# Patient Record
Sex: FEMALE | Race: BLACK OR AFRICAN AMERICAN | Employment: OTHER | URBAN - METROPOLITAN AREA
[De-identification: names, ages, dates, MRNs, and addresses within clinical notes are randomized per-mention and may not be internally consistent; named-entity substitution may affect disease eponyms.]

---

## 2017-02-23 ENCOUNTER — HOSPITAL ENCOUNTER (OUTPATIENT)
Dept: VASCULAR SURGERY | Age: 82
Discharge: HOME | End: 2017-02-23
Attending: PODIATRIST
Payer: MEDICARE

## 2017-02-23 DIAGNOSIS — L97.422 CHRONIC HEEL ULCER, LEFT, WITH FAT LAYER EXPOSED (HCC): ICD-10-CM

## 2017-02-23 DIAGNOSIS — I70.219 ATHEROSCLER NATIVE ARTERIES THE EXTREMITIES W/INTERMIT CLAUDICATION (HCC): ICD-10-CM

## 2017-02-23 PROCEDURE — 93923 UPR/LXTR ART STDY 3+ LVLS: CPT

## 2017-02-23 NOTE — PROCEDURES
[facility]  *** FINAL REPORT ***    Name: Petar Rios  MRN: [id number]    Outpatient  : 1935  HIS Order #: [id number]  [id number] [facility] Visit #: [id number]  Date: 2017    TYPE OF TEST: Peripheral Arterial Testing    REASON FOR TEST  Extremity ulceration (left side)    Right Leg  Doppler:    Normal  Ankle/Brachial: 1.01    Left Leg  Doppler:    Abnormal  Ankle/Brachial: 0.65    Site of occlusive disease:-  tibioperoneal segment    INTERPRETATION/FINDINGS  Physiologic testing was performed using continuous wave Doppler and  segmental pressures. 1. No evidence of significant arterial pcclusive disease in the right  leg at rest by the waveform analysis. Dorsalis artery is non  compressible and ankle brachial index on posterior tibial 1.01. Right toe/ brachial index is 0.40  2. Mild-moderate peripheral arterial disease indicated at rest in the  left leg. Disease is located in the tibioperoneal segment on the left  side by waveform. The left ankle/brachial index is 1.21 and the left  toe/brachial index is 0.29    ADDITIONAL COMMENTS    I have personally reviewed the data relevant to the interpretation of  this  study. TECHNOLOGIST: Sobia Maurice  Signed: 2017 12:20 PM    PHYSICIAN: Danny Arce MD  Signed: 2017 02:25 PM

## 2017-07-24 ENCOUNTER — APPOINTMENT (OUTPATIENT)
Dept: WOUND CARE | Age: 82
End: 2017-07-24

## 2018-11-05 ENCOUNTER — HOSPITAL ENCOUNTER (OUTPATIENT)
Dept: MRI IMAGING | Age: 83
Discharge: HOME | End: 2018-11-05
Attending: ORTHOPAEDIC SURGERY
Payer: MEDICARE

## 2018-11-05 DIAGNOSIS — M25.562 ARTHRALGIA OF KNEE, LEFT: ICD-10-CM

## 2018-11-05 DIAGNOSIS — Z96.659 PRESENCE OF UNSPECIFIED ARTIFICIAL KNEE JOINT: ICD-10-CM

## 2018-11-05 DIAGNOSIS — Z47.1 AFTERCARE FOLLOWING JOINT REPLACEMENT SURGERY: ICD-10-CM

## 2018-11-05 PROCEDURE — 73721 MRI JNT OF LWR EXTRE W/O DYE: CPT

## 2018-12-14 ENCOUNTER — HOSPITAL ENCOUNTER (OUTPATIENT)
Dept: GENERAL RADIOLOGY | Age: 83
Discharge: HOME | End: 2018-12-14
Attending: ORTHOPAEDIC SURGERY
Payer: MEDICARE

## 2018-12-14 ENCOUNTER — HOSPITAL ENCOUNTER (OUTPATIENT)
Dept: NUCLEAR MEDICINE | Age: 83
Discharge: HOME | End: 2018-12-14
Attending: ORTHOPAEDIC SURGERY
Payer: MEDICARE

## 2018-12-14 DIAGNOSIS — Z96.652 PRESENCE OF LEFT ARTIFICIAL KNEE JOINT: ICD-10-CM

## 2018-12-14 DIAGNOSIS — Z47.1 AFTERCARE FOLLOWING JOINT REPLACEMENT SURGERY: ICD-10-CM

## 2018-12-14 DIAGNOSIS — R52 PAIN: ICD-10-CM

## 2018-12-14 DIAGNOSIS — M25.562 LEFT KNEE PAIN: ICD-10-CM

## 2018-12-14 DIAGNOSIS — M19.90 OSTEOARTHRITIS: ICD-10-CM

## 2018-12-14 PROCEDURE — 78315 BONE IMAGING 3 PHASE: CPT

## 2018-12-14 PROCEDURE — 73564 X-RAY EXAM KNEE 4 OR MORE: CPT

## 2019-02-14 ENCOUNTER — HOSPITAL ENCOUNTER (OUTPATIENT)
Dept: PREADMISSION TESTING | Age: 84
Discharge: HOME | End: 2019-02-14
Payer: MEDICARE

## 2019-02-14 DIAGNOSIS — Z01.818 PREOP EXAMINATION: ICD-10-CM

## 2019-02-14 LAB
ALBUMIN SERPL-MCNC: 3.3 G/DL (ref 3.4–5)
ALBUMIN/GLOB SERPL: 1 {RATIO} (ref 0.8–1.7)
ALP SERPL-CCNC: 79 U/L (ref 45–117)
ALT SERPL-CCNC: 19 U/L (ref 13–56)
ANION GAP SERPL CALC-SCNC: 8 MMOL/L (ref 3–18)
APPEARANCE UR: CLEAR
APTT PPP: 40.3 SEC (ref 23–36.4)
AST SERPL-CCNC: 21 U/L (ref 15–37)
BACTERIA SPEC CULT: NORMAL
BACTERIA URNS QL MICRO: ABNORMAL /HPF
BASOPHILS # BLD: 0 K/UL (ref 0–0.1)
BASOPHILS NFR BLD: 0 % (ref 0–2)
BILIRUB SERPL-MCNC: 0.4 MG/DL (ref 0.2–1)
BILIRUB UR QL: NEGATIVE
BUN SERPL-MCNC: 17 MG/DL (ref 7–18)
BUN/CREAT SERPL: 18 (ref 12–20)
CALCIUM SERPL-MCNC: 8.8 MG/DL (ref 8.5–10.1)
CHLORIDE SERPL-SCNC: 104 MMOL/L (ref 100–108)
CO2 SERPL-SCNC: 32 MMOL/L (ref 21–32)
COLOR UR: YELLOW
CREAT SERPL-MCNC: 0.93 MG/DL (ref 0.6–1.3)
DIFFERENTIAL METHOD BLD: ABNORMAL
EOSINOPHIL # BLD: 0.1 K/UL (ref 0–0.4)
EOSINOPHIL NFR BLD: 2 % (ref 0–5)
EPITH CASTS URNS QL MICRO: ABNORMAL /LPF (ref 0–5)
ERYTHROCYTE [DISTWIDTH] IN BLOOD BY AUTOMATED COUNT: 14.7 % (ref 11.6–14.5)
ERYTHROCYTE [SEDIMENTATION RATE] IN BLOOD: 16 MM/HR (ref 0–30)
EST. AVERAGE GLUCOSE BLD GHB EST-MCNC: 123 MG/DL
GLOBULIN SER CALC-MCNC: 3.2 G/DL (ref 2–4)
GLUCOSE SERPL-MCNC: 91 MG/DL (ref 74–99)
GLUCOSE UR STRIP.AUTO-MCNC: NEGATIVE MG/DL
HBA1C MFR BLD: 5.9 % (ref 4.2–5.6)
HCT VFR BLD AUTO: 35.7 % (ref 35–45)
HGB BLD-MCNC: 11.2 G/DL (ref 12–16)
HGB UR QL STRIP: ABNORMAL
INR PPP: 1 (ref 0.8–1.2)
KETONES UR QL STRIP.AUTO: NEGATIVE MG/DL
LEUKOCYTE ESTERASE UR QL STRIP.AUTO: ABNORMAL
LYMPHOCYTES # BLD: 1.5 K/UL (ref 0.9–3.6)
LYMPHOCYTES NFR BLD: 33 % (ref 21–52)
MCH RBC QN AUTO: 28 PG (ref 24–34)
MCHC RBC AUTO-ENTMCNC: 31.4 G/DL (ref 31–37)
MCV RBC AUTO: 89.3 FL (ref 74–97)
MONOCYTES # BLD: 0.2 K/UL (ref 0.05–1.2)
MONOCYTES NFR BLD: 5 % (ref 3–10)
NEUTS SEG # BLD: 2.7 K/UL (ref 1.8–8)
NEUTS SEG NFR BLD: 60 % (ref 40–73)
NITRITE UR QL STRIP.AUTO: NEGATIVE
PH UR STRIP: 5.5 [PH] (ref 5–8)
PLATELET # BLD AUTO: 196 K/UL (ref 135–420)
PMV BLD AUTO: 9.6 FL (ref 9.2–11.8)
POTASSIUM SERPL-SCNC: 3.8 MMOL/L (ref 3.5–5.5)
PROT SERPL-MCNC: 6.5 G/DL (ref 6.4–8.2)
PROT UR STRIP-MCNC: NEGATIVE MG/DL
PROTHROMBIN TIME: 12.5 SEC (ref 11.5–15.2)
RBC # BLD AUTO: 4 M/UL (ref 4.2–5.3)
RBC #/AREA URNS HPF: ABNORMAL /HPF (ref 0–5)
SERVICE CMNT-IMP: NORMAL
SODIUM SERPL-SCNC: 144 MMOL/L (ref 136–145)
SP GR UR REFRACTOMETRY: 1.02 (ref 1–1.03)
UROBILINOGEN UR QL STRIP.AUTO: 0.2 EU/DL (ref 0.2–1)
WBC # BLD AUTO: 4.4 K/UL (ref 4.6–13.2)
WBC URNS QL MICRO: ABNORMAL /HPF (ref 0–5)

## 2019-02-14 PROCEDURE — 80053 COMPREHEN METABOLIC PANEL: CPT

## 2019-02-14 PROCEDURE — 85610 PROTHROMBIN TIME: CPT

## 2019-02-14 PROCEDURE — 36415 COLL VENOUS BLD VENIPUNCTURE: CPT

## 2019-02-14 PROCEDURE — 85730 THROMBOPLASTIN TIME PARTIAL: CPT

## 2019-02-14 PROCEDURE — 85652 RBC SED RATE AUTOMATED: CPT

## 2019-02-14 PROCEDURE — 81001 URINALYSIS AUTO W/SCOPE: CPT

## 2019-02-14 PROCEDURE — 83036 HEMOGLOBIN GLYCOSYLATED A1C: CPT

## 2019-02-14 PROCEDURE — 87086 URINE CULTURE/COLONY COUNT: CPT

## 2019-02-14 PROCEDURE — 85025 COMPLETE CBC W/AUTO DIFF WBC: CPT

## 2019-02-14 PROCEDURE — 93005 ELECTROCARDIOGRAM TRACING: CPT

## 2019-02-14 PROCEDURE — 87641 MR-STAPH DNA AMP PROBE: CPT

## 2019-02-15 LAB
ATRIAL RATE: 63 BPM
BACTERIA SPEC CULT: NORMAL
CALCULATED P AXIS, ECG09: 56 DEGREES
CALCULATED R AXIS, ECG10: -69 DEGREES
CALCULATED T AXIS, ECG11: -30 DEGREES
DIAGNOSIS, 93000: NORMAL
P-R INTERVAL, ECG05: 176 MS
Q-T INTERVAL, ECG07: 422 MS
QRS DURATION, ECG06: 104 MS
QTC CALCULATION (BEZET), ECG08: 431 MS
SERVICE CMNT-IMP: NORMAL
VENTRICULAR RATE, ECG03: 63 BPM

## 2019-04-05 ENCOUNTER — HOSPITAL ENCOUNTER (OUTPATIENT)
Dept: PREADMISSION TESTING | Age: 84
Discharge: HOME | End: 2019-04-05
Payer: MEDICARE

## 2019-04-05 LAB
ALBUMIN SERPL-MCNC: 3.5 G/DL (ref 3.4–5)
ALBUMIN/GLOB SERPL: 1 {RATIO} (ref 0.8–1.7)
ALP SERPL-CCNC: 71 U/L (ref 45–117)
ALT SERPL-CCNC: 19 U/L (ref 13–56)
ANION GAP SERPL CALC-SCNC: 4 MMOL/L (ref 3–18)
APPEARANCE UR: CLEAR
APTT PPP: 38.4 SEC (ref 23–36.4)
AST SERPL-CCNC: 20 U/L (ref 15–37)
BACTERIA SPEC CULT: NORMAL
BASOPHILS # BLD: 0 K/UL (ref 0–0.1)
BASOPHILS NFR BLD: 0 % (ref 0–2)
BILIRUB SERPL-MCNC: 0.8 MG/DL (ref 0.2–1)
BILIRUB UR QL: NEGATIVE
BUN SERPL-MCNC: 18 MG/DL (ref 7–18)
BUN/CREAT SERPL: 19 (ref 12–20)
CALCIUM SERPL-MCNC: 8.9 MG/DL (ref 8.5–10.1)
CHLORIDE SERPL-SCNC: 105 MMOL/L (ref 100–108)
CO2 SERPL-SCNC: 32 MMOL/L (ref 21–32)
COLOR UR: YELLOW
CREAT SERPL-MCNC: 0.93 MG/DL (ref 0.6–1.3)
DIFFERENTIAL METHOD BLD: ABNORMAL
EOSINOPHIL # BLD: 0.1 K/UL (ref 0–0.4)
EOSINOPHIL NFR BLD: 1 % (ref 0–5)
ERYTHROCYTE [DISTWIDTH] IN BLOOD BY AUTOMATED COUNT: 15.4 % (ref 11.6–14.5)
ERYTHROCYTE [SEDIMENTATION RATE] IN BLOOD: 17 MM/HR (ref 0–30)
EST. AVERAGE GLUCOSE BLD GHB EST-MCNC: 114 MG/DL
GLOBULIN SER CALC-MCNC: 3.4 G/DL (ref 2–4)
GLUCOSE SERPL-MCNC: 90 MG/DL (ref 74–99)
GLUCOSE UR STRIP.AUTO-MCNC: NEGATIVE MG/DL
HBA1C MFR BLD: 5.6 % (ref 4.2–5.6)
HCT VFR BLD AUTO: 33.8 % (ref 35–45)
HGB BLD-MCNC: 10.8 G/DL (ref 12–16)
HGB UR QL STRIP: NEGATIVE
INR PPP: 1 (ref 0.8–1.2)
KETONES UR QL STRIP.AUTO: NEGATIVE MG/DL
LEUKOCYTE ESTERASE UR QL STRIP.AUTO: NEGATIVE
LYMPHOCYTES # BLD: 1.8 K/UL (ref 0.9–3.6)
LYMPHOCYTES NFR BLD: 27 % (ref 21–52)
MCH RBC QN AUTO: 28.4 PG (ref 24–34)
MCHC RBC AUTO-ENTMCNC: 32 G/DL (ref 31–37)
MCV RBC AUTO: 88.9 FL (ref 74–97)
MONOCYTES # BLD: 0.3 K/UL (ref 0.05–1.2)
MONOCYTES NFR BLD: 5 % (ref 3–10)
NEUTS SEG # BLD: 4.3 K/UL (ref 1.8–8)
NEUTS SEG NFR BLD: 67 % (ref 40–73)
NITRITE UR QL STRIP.AUTO: NEGATIVE
PH UR STRIP: 5.5 [PH] (ref 5–8)
PLATELET # BLD AUTO: 236 K/UL (ref 135–420)
PMV BLD AUTO: 9.9 FL (ref 9.2–11.8)
POTASSIUM SERPL-SCNC: 3.9 MMOL/L (ref 3.5–5.5)
PROT SERPL-MCNC: 6.9 G/DL (ref 6.4–8.2)
PROT UR STRIP-MCNC: NEGATIVE MG/DL
PROTHROMBIN TIME: 12.8 SEC (ref 11.5–15.2)
RBC # BLD AUTO: 3.8 M/UL (ref 4.2–5.3)
SERVICE CMNT-IMP: NORMAL
SODIUM SERPL-SCNC: 141 MMOL/L (ref 136–145)
SP GR UR REFRACTOMETRY: 1.01 (ref 1–1.03)
UROBILINOGEN UR QL STRIP.AUTO: 0.2 EU/DL (ref 0.2–1)
WBC # BLD AUTO: 6.5 K/UL (ref 4.6–13.2)

## 2019-04-05 PROCEDURE — 85652 RBC SED RATE AUTOMATED: CPT

## 2019-04-05 PROCEDURE — 81003 URINALYSIS AUTO W/O SCOPE: CPT

## 2019-04-05 PROCEDURE — 87086 URINE CULTURE/COLONY COUNT: CPT

## 2019-04-05 PROCEDURE — 36415 COLL VENOUS BLD VENIPUNCTURE: CPT

## 2019-04-05 PROCEDURE — 87641 MR-STAPH DNA AMP PROBE: CPT

## 2019-04-05 PROCEDURE — 85025 COMPLETE CBC W/AUTO DIFF WBC: CPT

## 2019-04-05 PROCEDURE — 83036 HEMOGLOBIN GLYCOSYLATED A1C: CPT

## 2019-04-05 PROCEDURE — 85610 PROTHROMBIN TIME: CPT

## 2019-04-05 PROCEDURE — 85730 THROMBOPLASTIN TIME PARTIAL: CPT

## 2019-04-05 PROCEDURE — 80053 COMPREHEN METABOLIC PANEL: CPT

## 2019-04-06 LAB
BACTERIA SPEC CULT: ABNORMAL
BACTERIA SPEC CULT: ABNORMAL
SERVICE CMNT-IMP: ABNORMAL

## 2019-04-25 NOTE — H&P
[address] History and Physical Exam 
 
Patient: Diana Vang MRN: [id number]  SSN: [id number] YOB: 1935  Age: 81 y.o. Sex: female Subjective: Chief Complaint: left knee pain History of Present Illness:  Patient complains of left knee pain and difficulty ambulating. Patient had primary L total knee replacement 2/21/2005 Past Medical History:  
Diagnosis Date  Arthritis  CAD (coronary artery disease) Athrosclerotic, recent cardiac cath 2019  Hypertension many years  Ill-defined condition   
 varicose veins bilateral  
 Thyroid disease many years Past Surgical History:  
Procedure Laterality Date  HX CATARACT REMOVAL    
 HX HEART CATHETERIZATION  04/2019  HX HYSTERECTOMY  HX ORTHOPAEDIC Left   
 hip replacement  HX ORTHOPAEDIC Bilateral   
 knee replacement  HX ORTHOPAEDIC Right   
 thr  
 
Social History Occupational History  Not on file Tobacco Use  Smoking status: Never Smoker  Smokeless tobacco: Never Used Substance and Sexual Activity  Alcohol use: No  
 Drug use: No  
 Sexual activity: Not on file Comment: No  
 
Prior to Admission medications Medication Sig Start Date End Date Taking? Authorizing Provider  
alendronate (FOSAMAX) 70 mg tablet Take 70 mg by mouth Every Saturday. Provider, Historical  
ibuprofen (MOTRIN) 400 mg tablet Take  by mouth every six (6) hours as needed for Pain. Provider, Historical  
acetaminophen (TYLENOL) 325 mg tablet Take 650 mg by mouth every four (4) hours as needed for Pain. Provider, Historical  
lisinopril-hydroCHLOROthiazide (PRINZIDE, ZESTORETIC) 20-12.5 mg per tablet Take 1 Tab by mouth daily. Provider, Historical  
cholecalciferol (VITAMIN D3) 1,000 unit tablet Take 1,000 Units by mouth daily. Provider, Historical  
vit B-comp w-Fe,Ca,FA<1mg (IRON-VITAMINS PO) Take  by mouth.     Provider, Historical  
 ferrous sulfate (IRON) 325 mg (65 mg iron) tablet Take  by mouth Daily (before breakfast). Provider, Historical  
levothyroxine (SYNTHROID) 25 mcg tablet Take 50 mcg by mouth Daily (before breakfast). Provider, Historical  
cyanocobalamin 1,000 mcg tablet Take 1,000 mcg by mouth daily. Provider, Historical  
 
 
Allergies: Allergies Allergen Reactions  Morphine Other (comments) \"Goes crazy\" Family History: Hypertension, osteoarthritis, heart disease Review of Systems: A comprehensive review of systems was negative except for that written in the History of Present Illness. Objective:   
  
Physical Exam: 
HEENT: Normocephalic, atraumatic Lungs:  Clear to auscultation Heart:   Regular rate and rhythm Abdomen: Soft Extremities:  Pain with range of motion of the left knee Neurological: Grossly neurovascularly intact Assessment:   
 
Failed left total knee arthroplasty. Plan: The patient has failed previous efforts of conservative management to include rest and therapy. Due to the fact that conservative efforts failed, the patient became a candidate for surgical intervention. Proceed with scheduled left total knee arthroplasty revision. The various methods of treatment have been discussed with the patient and family. After consideration of risks, benefits, and other options for treatment, the patient has consented to surgical interventions. Questions were answered and preoperative teaching was done by Dr. Rupert Contreras. Patient being admitted as inpatient due to patient age and nature of the surgery. Signed By: Manolo Guy PA-C April 25, 2019

## 2019-04-29 ENCOUNTER — ANESTHESIA (OUTPATIENT)
Dept: SURGERY | Age: 84
DRG: 468 | End: 2019-04-29
Payer: MEDICARE

## 2019-04-29 ENCOUNTER — APPOINTMENT (OUTPATIENT)
Dept: GENERAL RADIOLOGY | Age: 84
DRG: 468 | End: 2019-04-29
Attending: PHYSICIAN ASSISTANT
Payer: MEDICARE

## 2019-04-29 ENCOUNTER — HOSPITAL ENCOUNTER (INPATIENT)
Age: 84
LOS: 2 days | Discharge: NURSING, REHAB OR LONG-TERM CARE | DRG: 468 | End: 2019-05-01
Attending: ORTHOPAEDIC SURGERY | Admitting: ORTHOPAEDIC SURGERY
Payer: MEDICARE

## 2019-04-29 ENCOUNTER — ANESTHESIA EVENT (OUTPATIENT)
Dept: SURGERY | Age: 84
DRG: 468 | End: 2019-04-29
Payer: MEDICARE

## 2019-04-29 DIAGNOSIS — Z96.652 S/P REVISION OF TOTAL KNEE, LEFT: Primary | ICD-10-CM

## 2019-04-29 PROBLEM — T84.018A FAILED TOTAL KNEE ARTHROPLASTY (HCC): Status: ACTIVE | Noted: 2019-04-29

## 2019-04-29 PROBLEM — Z96.659 FAILED TOTAL KNEE ARTHROPLASTY (HCC): Status: ACTIVE | Noted: 2019-04-29

## 2019-04-29 LAB
ABO + RH BLD: NORMAL
BLOOD GROUP ANTIBODIES SERPL: NORMAL
SPECIMEN EXP DATE BLD: NORMAL

## 2019-04-29 PROCEDURE — C1776 JOINT DEVICE (IMPLANTABLE): HCPCS | Performed by: ORTHOPAEDIC SURGERY

## 2019-04-29 PROCEDURE — 77030016060 HC NDL NRV BLK TELE -A: Performed by: SPECIALIST

## 2019-04-29 PROCEDURE — 77030011628: Performed by: ORTHOPAEDIC SURGERY

## 2019-04-29 PROCEDURE — 77030006788 HC BLD SAW OSC STRY -B: Performed by: ORTHOPAEDIC SURGERY

## 2019-04-29 PROCEDURE — 74011000250 HC RX REV CODE- 250: Performed by: ORTHOPAEDIC SURGERY

## 2019-04-29 PROCEDURE — 77030018846 HC SOL IRR STRL H20 ICUM -A: Performed by: ORTHOPAEDIC SURGERY

## 2019-04-29 PROCEDURE — 36415 COLL VENOUS BLD VENIPUNCTURE: CPT

## 2019-04-29 PROCEDURE — 77030003666 HC NDL SPINAL BD -A: Performed by: ORTHOPAEDIC SURGERY

## 2019-04-29 PROCEDURE — 74011250637 HC RX REV CODE- 250/637: Performed by: SPECIALIST

## 2019-04-29 PROCEDURE — 77030020813 HC INST SCULP CEM KT DISP S&N -B: Performed by: ORTHOPAEDIC SURGERY

## 2019-04-29 PROCEDURE — 76210000006 HC OR PH I REC 0.5 TO 1 HR: Performed by: ORTHOPAEDIC SURGERY

## 2019-04-29 PROCEDURE — 76060000041 HC ANESTHESIA 5 TO 5.5 HR: Performed by: ORTHOPAEDIC SURGERY

## 2019-04-29 PROCEDURE — 77030018835 HC SOL IRR LR ICUM -A: Performed by: ORTHOPAEDIC SURGERY

## 2019-04-29 PROCEDURE — C1713 ANCHOR/SCREW BN/BN,TIS/BN: HCPCS | Performed by: ORTHOPAEDIC SURGERY

## 2019-04-29 PROCEDURE — 0SRD0J9 REPLACEMENT OF LEFT KNEE JOINT WITH SYNTHETIC SUBSTITUTE, CEMENTED, OPEN APPROACH: ICD-10-PCS | Performed by: ORTHOPAEDIC SURGERY

## 2019-04-29 PROCEDURE — 77030011264 HC ELECTRD BLD EXT COVD -A: Performed by: ORTHOPAEDIC SURGERY

## 2019-04-29 PROCEDURE — 74011250636 HC RX REV CODE- 250/636: Performed by: PHYSICIAN ASSISTANT

## 2019-04-29 PROCEDURE — 77030000032 HC CUF TRNQT ZIMM -B: Performed by: ORTHOPAEDIC SURGERY

## 2019-04-29 PROCEDURE — 77030012551: Performed by: ORTHOPAEDIC SURGERY

## 2019-04-29 PROCEDURE — 74011250637 HC RX REV CODE- 250/637: Performed by: PHYSICIAN ASSISTANT

## 2019-04-29 PROCEDURE — 77030035643 HC BLD SAW OSC PRECIS STRY -C: Performed by: ORTHOPAEDIC SURGERY

## 2019-04-29 PROCEDURE — 77030036563 HC WRP CLD THER KNE S2SG -B: Performed by: ORTHOPAEDIC SURGERY

## 2019-04-29 PROCEDURE — 76010000137 HC OR TIME 5 TO 5.5 HR: Performed by: ORTHOPAEDIC SURGERY

## 2019-04-29 PROCEDURE — 74011000250 HC RX REV CODE- 250

## 2019-04-29 PROCEDURE — 74011000250 HC RX REV CODE- 250: Performed by: PHYSICIAN ASSISTANT

## 2019-04-29 PROCEDURE — C9290 INJ, BUPIVACAINE LIPOSOME: HCPCS | Performed by: ORTHOPAEDIC SURGERY

## 2019-04-29 PROCEDURE — 74011250636 HC RX REV CODE- 250/636

## 2019-04-29 PROCEDURE — 77030013708 HC HNDPC SUC IRR PULS STRY –B: Performed by: ORTHOPAEDIC SURGERY

## 2019-04-29 PROCEDURE — 77030006835 HC BLD SAW SAG STRY -B: Performed by: ORTHOPAEDIC SURGERY

## 2019-04-29 PROCEDURE — 77030003029 HC SUT VCRL J&J -B: Performed by: ORTHOPAEDIC SURGERY

## 2019-04-29 PROCEDURE — 74011000258 HC RX REV CODE- 258: Performed by: ORTHOPAEDIC SURGERY

## 2019-04-29 PROCEDURE — 65270000029 HC RM PRIVATE

## 2019-04-29 PROCEDURE — 77030040361 HC SLV COMPR DVT MDII -B: Performed by: ORTHOPAEDIC SURGERY

## 2019-04-29 PROCEDURE — 74011250636 HC RX REV CODE- 250/636: Performed by: SPECIALIST

## 2019-04-29 PROCEDURE — 77030012508 HC MSK AIRWY LMA AMBU -A: Performed by: ANESTHESIOLOGY

## 2019-04-29 PROCEDURE — 64450 NJX AA&/STRD OTHER PN/BRANCH: CPT | Performed by: SPECIALIST

## 2019-04-29 PROCEDURE — 74011250636 HC RX REV CODE- 250/636: Performed by: ORTHOPAEDIC SURGERY

## 2019-04-29 PROCEDURE — 77030003028 HC SUT VCRL J&J -A: Performed by: ORTHOPAEDIC SURGERY

## 2019-04-29 PROCEDURE — 77030020782 HC GWN BAIR PAWS FLX 3M -B: Performed by: ORTHOPAEDIC SURGERY

## 2019-04-29 PROCEDURE — 86900 BLOOD TYPING SEROLOGIC ABO: CPT

## 2019-04-29 PROCEDURE — 73560 X-RAY EXAM OF KNEE 1 OR 2: CPT

## 2019-04-29 PROCEDURE — 0SPD0JZ REMOVAL OF SYNTHETIC SUBSTITUTE FROM LEFT KNEE JOINT, OPEN APPROACH: ICD-10-PCS | Performed by: ORTHOPAEDIC SURGERY

## 2019-04-29 PROCEDURE — 77030013728 HC IRR FEM CNL STRY -B: Performed by: ORTHOPAEDIC SURGERY

## 2019-04-29 PROCEDURE — 77030020269 HC MISC IMPL: Performed by: ORTHOPAEDIC SURGERY

## 2019-04-29 DEVICE — LEGION PRESSFIT STEM 16MM X 160MM
Type: IMPLANTABLE DEVICE | Site: KNEE | Status: FUNCTIONAL
Brand: LEGION

## 2019-04-29 DEVICE — LEGION OFFSET COUPLER 4MM
Type: IMPLANTABLE DEVICE | Site: KNEE | Status: FUNCTIONAL
Brand: LEGION

## 2019-04-29 DEVICE — LEGION REVISION TIBIAL BASEPLATE                                    SIZE 5 LEFT
Type: IMPLANTABLE DEVICE | Site: KNEE | Status: FUNCTIONAL
Brand: LEGION

## 2019-04-29 DEVICE — LEGION OXINIUM CONSTRAINED FEMORAL                                    SIZE 4 LEFT
Type: IMPLANTABLE DEVICE | Site: KNEE | Status: FUNCTIONAL
Brand: LEGION

## 2019-04-29 DEVICE — LEGION SCREW-ON DISTAL FEMORAL                                    WEDGE SIZE 4 5MM
Type: IMPLANTABLE DEVICE | Site: KNEE | Status: FUNCTIONAL
Brand: LEGION

## 2019-04-29 DEVICE — CEMENT BNE 20ML 41GM FULL DOSE PMMA W/ TOBRA M VISC RADPQ: Type: IMPLANTABLE DEVICE | Site: KNEE | Status: FUNCTIONAL

## 2019-04-29 RX ORDER — LISINOPRIL AND HYDROCHLOROTHIAZIDE 12.5; 2 MG/1; MG/1
1 TABLET ORAL DAILY
Status: DISCONTINUED | OUTPATIENT
Start: 2019-04-30 | End: 2019-04-29 | Stop reason: CLARIF

## 2019-04-29 RX ORDER — CEFAZOLIN SODIUM 2 G/50ML
2 SOLUTION INTRAVENOUS ONCE
Status: COMPLETED | OUTPATIENT
Start: 2019-04-29 | End: 2019-04-29

## 2019-04-29 RX ORDER — OXYCODONE AND ACETAMINOPHEN 5; 325 MG/1; MG/1
1-2 TABLET ORAL
Status: DISCONTINUED | OUTPATIENT
Start: 2019-04-29 | End: 2019-05-01 | Stop reason: HOSPADM

## 2019-04-29 RX ORDER — LIDOCAINE HYDROCHLORIDE 20 MG/ML
INJECTION, SOLUTION EPIDURAL; INFILTRATION; INTRACAUDAL; PERINEURAL AS NEEDED
Status: DISCONTINUED | OUTPATIENT
Start: 2019-04-29 | End: 2019-04-29 | Stop reason: HOSPADM

## 2019-04-29 RX ORDER — LEVOTHYROXINE SODIUM 50 UG/1
50 TABLET ORAL
Status: DISCONTINUED | OUTPATIENT
Start: 2019-04-30 | End: 2019-05-01 | Stop reason: HOSPADM

## 2019-04-29 RX ORDER — ACETAMINOPHEN 500 MG
1000 TABLET ORAL
Status: COMPLETED | OUTPATIENT
Start: 2019-04-29 | End: 2019-04-29

## 2019-04-29 RX ORDER — DOCUSATE SODIUM 100 MG/1
100 CAPSULE, LIQUID FILLED ORAL
Status: DISCONTINUED | OUTPATIENT
Start: 2019-04-29 | End: 2019-05-01 | Stop reason: HOSPADM

## 2019-04-29 RX ORDER — CEFAZOLIN SODIUM 2 G/50ML
2 SOLUTION INTRAVENOUS EVERY 8 HOURS
Status: COMPLETED | OUTPATIENT
Start: 2019-04-30 | End: 2019-04-30

## 2019-04-29 RX ORDER — PROPOFOL 10 MG/ML
INJECTION, EMULSION INTRAVENOUS AS NEEDED
Status: DISCONTINUED | OUTPATIENT
Start: 2019-04-29 | End: 2019-04-29 | Stop reason: HOSPADM

## 2019-04-29 RX ORDER — ASPIRIN 81 MG/1
81 TABLET ORAL 2 TIMES DAILY
Status: DISCONTINUED | OUTPATIENT
Start: 2019-04-29 | End: 2019-05-01 | Stop reason: HOSPADM

## 2019-04-29 RX ORDER — ROPIVACAINE HYDROCHLORIDE 5 MG/ML
INJECTION, SOLUTION EPIDURAL; INFILTRATION; PERINEURAL AS NEEDED
Status: DISCONTINUED | OUTPATIENT
Start: 2019-04-29 | End: 2019-04-29 | Stop reason: HOSPADM

## 2019-04-29 RX ORDER — LORAZEPAM 2 MG/ML
1 INJECTION INTRAMUSCULAR
Status: DISCONTINUED | OUTPATIENT
Start: 2019-04-29 | End: 2019-05-01 | Stop reason: HOSPADM

## 2019-04-29 RX ORDER — ONDANSETRON 2 MG/ML
4 INJECTION INTRAMUSCULAR; INTRAVENOUS
Status: DISCONTINUED | OUTPATIENT
Start: 2019-04-29 | End: 2019-05-01 | Stop reason: HOSPADM

## 2019-04-29 RX ORDER — SODIUM CHLORIDE, SODIUM LACTATE, POTASSIUM CHLORIDE, CALCIUM CHLORIDE 600; 310; 30; 20 MG/100ML; MG/100ML; MG/100ML; MG/100ML
50 INJECTION, SOLUTION INTRAVENOUS CONTINUOUS
Status: DISCONTINUED | OUTPATIENT
Start: 2019-04-29 | End: 2019-04-29 | Stop reason: HOSPADM

## 2019-04-29 RX ORDER — SODIUM CHLORIDE 0.9 % (FLUSH) 0.9 %
5-40 SYRINGE (ML) INJECTION AS NEEDED
Status: DISCONTINUED | OUTPATIENT
Start: 2019-04-29 | End: 2019-05-01 | Stop reason: HOSPADM

## 2019-04-29 RX ORDER — CELECOXIB 100 MG/1
200 CAPSULE ORAL
Status: COMPLETED | OUTPATIENT
Start: 2019-04-29 | End: 2019-04-29

## 2019-04-29 RX ORDER — SODIUM CHLORIDE 0.9 % (FLUSH) 0.9 %
5-40 SYRINGE (ML) INJECTION EVERY 8 HOURS
Status: DISCONTINUED | OUTPATIENT
Start: 2019-04-29 | End: 2019-05-01 | Stop reason: HOSPADM

## 2019-04-29 RX ORDER — LANOLIN ALCOHOL/MO/W.PET/CERES
1 CREAM (GRAM) TOPICAL
Status: DISCONTINUED | OUTPATIENT
Start: 2019-04-30 | End: 2019-05-01 | Stop reason: HOSPADM

## 2019-04-29 RX ORDER — SODIUM CHLORIDE, SODIUM LACTATE, POTASSIUM CHLORIDE, CALCIUM CHLORIDE 600; 310; 30; 20 MG/100ML; MG/100ML; MG/100ML; MG/100ML
75 INJECTION, SOLUTION INTRAVENOUS CONTINUOUS
Status: DISPENSED | OUTPATIENT
Start: 2019-04-29 | End: 2019-04-30

## 2019-04-29 RX ORDER — FENTANYL CITRATE 50 UG/ML
INJECTION, SOLUTION INTRAMUSCULAR; INTRAVENOUS AS NEEDED
Status: DISCONTINUED | OUTPATIENT
Start: 2019-04-29 | End: 2019-04-29 | Stop reason: HOSPADM

## 2019-04-29 RX ORDER — DEXMEDETOMIDINE HYDROCHLORIDE 4 UG/ML
INJECTION, SOLUTION INTRAVENOUS AS NEEDED
Status: DISCONTINUED | OUTPATIENT
Start: 2019-04-29 | End: 2019-04-29 | Stop reason: HOSPADM

## 2019-04-29 RX ORDER — ONDANSETRON 2 MG/ML
4 INJECTION INTRAMUSCULAR; INTRAVENOUS ONCE
Status: DISCONTINUED | OUTPATIENT
Start: 2019-04-29 | End: 2019-04-29 | Stop reason: HOSPADM

## 2019-04-29 RX ORDER — SODIUM CHLORIDE, SODIUM LACTATE, POTASSIUM CHLORIDE, CALCIUM CHLORIDE 600; 310; 30; 20 MG/100ML; MG/100ML; MG/100ML; MG/100ML
125 INJECTION, SOLUTION INTRAVENOUS CONTINUOUS
Status: DISCONTINUED | OUTPATIENT
Start: 2019-04-29 | End: 2019-05-01 | Stop reason: HOSPADM

## 2019-04-29 RX ORDER — DIPHENHYDRAMINE HCL 25 MG
25 CAPSULE ORAL
Status: DISCONTINUED | OUTPATIENT
Start: 2019-04-29 | End: 2019-05-01 | Stop reason: HOSPADM

## 2019-04-29 RX ORDER — NALOXONE HYDROCHLORIDE 0.4 MG/ML
0.4 INJECTION, SOLUTION INTRAMUSCULAR; INTRAVENOUS; SUBCUTANEOUS AS NEEDED
Status: DISCONTINUED | OUTPATIENT
Start: 2019-04-29 | End: 2019-05-01 | Stop reason: HOSPADM

## 2019-04-29 RX ORDER — HYDROMORPHONE HYDROCHLORIDE 1 MG/ML
0.5 INJECTION, SOLUTION INTRAMUSCULAR; INTRAVENOUS; SUBCUTANEOUS
Status: DISCONTINUED | OUTPATIENT
Start: 2019-04-29 | End: 2019-05-01 | Stop reason: HOSPADM

## 2019-04-29 RX ORDER — FENTANYL CITRATE 50 UG/ML
25 INJECTION, SOLUTION INTRAMUSCULAR; INTRAVENOUS
Status: DISCONTINUED | OUTPATIENT
Start: 2019-04-29 | End: 2019-04-29 | Stop reason: HOSPADM

## 2019-04-29 RX ORDER — ONDANSETRON 2 MG/ML
INJECTION INTRAMUSCULAR; INTRAVENOUS AS NEEDED
Status: DISCONTINUED | OUTPATIENT
Start: 2019-04-29 | End: 2019-04-29 | Stop reason: HOSPADM

## 2019-04-29 RX ORDER — GLYCOPYRROLATE 0.2 MG/ML
INJECTION INTRAMUSCULAR; INTRAVENOUS AS NEEDED
Status: DISCONTINUED | OUTPATIENT
Start: 2019-04-29 | End: 2019-04-29 | Stop reason: HOSPADM

## 2019-04-29 RX ORDER — NALOXONE HYDROCHLORIDE 0.4 MG/ML
0.1 INJECTION, SOLUTION INTRAMUSCULAR; INTRAVENOUS; SUBCUTANEOUS
Status: DISCONTINUED | OUTPATIENT
Start: 2019-04-29 | End: 2019-04-29 | Stop reason: HOSPADM

## 2019-04-29 RX ORDER — TRANEXAMIC ACID 100 MG/ML
1 INJECTION, SOLUTION INTRAVENOUS ONCE
Status: COMPLETED | OUTPATIENT
Start: 2019-04-29 | End: 2019-04-29

## 2019-04-29 RX ORDER — TRANEXAMIC ACID 100 MG/ML
INJECTION, SOLUTION INTRAVENOUS AS NEEDED
Status: DISCONTINUED | OUTPATIENT
Start: 2019-04-29 | End: 2019-04-29 | Stop reason: HOSPADM

## 2019-04-29 RX ADMIN — DEXMEDETOMIDINE HYDROCHLORIDE 4 MCG: 4 INJECTION, SOLUTION INTRAVENOUS at 14:58

## 2019-04-29 RX ADMIN — ONDANSETRON 4 MG: 2 INJECTION INTRAMUSCULAR; INTRAVENOUS at 17:50

## 2019-04-29 RX ADMIN — SODIUM CHLORIDE, SODIUM LACTATE, POTASSIUM CHLORIDE, AND CALCIUM CHLORIDE 125 ML/HR: 600; 310; 30; 20 INJECTION, SOLUTION INTRAVENOUS at 10:45

## 2019-04-29 RX ADMIN — SODIUM CHLORIDE, SODIUM LACTATE, POTASSIUM CHLORIDE, AND CALCIUM CHLORIDE: 600; 310; 30; 20 INJECTION, SOLUTION INTRAVENOUS at 18:00

## 2019-04-29 RX ADMIN — ONDANSETRON 4 MG: 2 INJECTION INTRAMUSCULAR; INTRAVENOUS at 13:22

## 2019-04-29 RX ADMIN — SODIUM CHLORIDE, SODIUM LACTATE, POTASSIUM CHLORIDE, AND CALCIUM CHLORIDE 75 ML/HR: 600; 310; 30; 20 INJECTION, SOLUTION INTRAVENOUS at 20:21

## 2019-04-29 RX ADMIN — ACETAMINOPHEN 1000 MG: 500 TABLET, FILM COATED ORAL at 10:52

## 2019-04-29 RX ADMIN — CELECOXIB 200 MG: 100 CAPSULE ORAL at 10:52

## 2019-04-29 RX ADMIN — LIDOCAINE HYDROCHLORIDE 100 MG: 20 INJECTION, SOLUTION EPIDURAL; INFILTRATION; INTRACAUDAL; PERINEURAL at 13:26

## 2019-04-29 RX ADMIN — FENTANYL CITRATE 50 MCG: 50 INJECTION, SOLUTION INTRAMUSCULAR; INTRAVENOUS at 13:26

## 2019-04-29 RX ADMIN — ASPIRIN 81 MG: 81 TABLET, COATED ORAL at 21:20

## 2019-04-29 RX ADMIN — FENTANYL CITRATE 25 MCG: 50 INJECTION, SOLUTION INTRAMUSCULAR; INTRAVENOUS at 15:16

## 2019-04-29 RX ADMIN — CEFAZOLIN SODIUM 1 G: 2 SOLUTION INTRAVENOUS at 17:48

## 2019-04-29 RX ADMIN — ROPIVACAINE HYDROCHLORIDE 25 ML: 5 INJECTION, SOLUTION EPIDURAL; INFILTRATION; PERINEURAL at 11:48

## 2019-04-29 RX ADMIN — DEXMEDETOMIDINE HYDROCHLORIDE 4 MCG: 4 INJECTION, SOLUTION INTRAVENOUS at 13:59

## 2019-04-29 RX ADMIN — PROPOFOL 140 MG: 10 INJECTION, EMULSION INTRAVENOUS at 13:27

## 2019-04-29 RX ADMIN — GLYCOPYRROLATE 0.2 MG: 0.2 INJECTION INTRAMUSCULAR; INTRAVENOUS at 13:22

## 2019-04-29 RX ADMIN — OXYCODONE AND ACETAMINOPHEN 1 TABLET: 5; 325 TABLET ORAL at 21:20

## 2019-04-29 RX ADMIN — TRANEXAMIC ACID 1 G: 100 INJECTION, SOLUTION INTRAVENOUS at 13:32

## 2019-04-29 RX ADMIN — FENTANYL CITRATE 25 MCG: 50 INJECTION, SOLUTION INTRAMUSCULAR; INTRAVENOUS at 18:55

## 2019-04-29 RX ADMIN — DEXMEDETOMIDINE HYDROCHLORIDE 4 MCG: 4 INJECTION, SOLUTION INTRAVENOUS at 14:00

## 2019-04-29 RX ADMIN — FENTANYL CITRATE 25 MCG: 50 INJECTION, SOLUTION INTRAMUSCULAR; INTRAVENOUS at 14:58

## 2019-04-29 RX ADMIN — FENTANYL CITRATE 25 MCG: 50 INJECTION, SOLUTION INTRAMUSCULAR; INTRAVENOUS at 13:55

## 2019-04-29 RX ADMIN — SODIUM CHLORIDE, SODIUM LACTATE, POTASSIUM CHLORIDE, AND CALCIUM CHLORIDE: 600; 310; 30; 20 INJECTION, SOLUTION INTRAVENOUS at 14:11

## 2019-04-29 RX ADMIN — CEFAZOLIN SODIUM 2 G: 2 SOLUTION INTRAVENOUS at 13:19

## 2019-04-29 RX ADMIN — DEXMEDETOMIDINE HYDROCHLORIDE 4 MCG: 4 INJECTION, SOLUTION INTRAVENOUS at 14:51

## 2019-04-29 NOTE — ANESTHESIA POSTPROCEDURE EVALUATION
Post-Anesthesia Evaluation and Assessment Cardiovascular Function/Vital Signs Visit Vitals /60 Pulse (!) 58 Temp 37.1 °C (98.7 °F) Resp 12 Ht 5' 3.5\" (1.613 m) SpO2 100% BMI 34.87 kg/m² Patient is status post Procedure(s): LEFT KNEE: REVISION TOTAL KNEE REPLACEMENT. Nausea/Vomiting: Controlled. Postoperative hydration reviewed and adequate. Pain: 
Pain Scale 1: Numeric (0 - 10) (04/29/19 1915) Pain Intensity 1: 3 (04/29/19 1915) Managed. Neurological Status:  
Neuro (WDL): Exceptions to [facility] (04/29/19 1915) At baseline. Mental Status and Level of Consciousness: Arousable. Pulmonary Status:  
O2 Device: Nasal cannula (04/29/19 1915) Adequate oxygenation and airway patent. Complications related to anesthesia: None Post-anesthesia assessment completed. No concerns. Patient has met all discharge requirements. Signed By: Fermín Boggs MD  
 April 29, 2019

## 2019-04-29 NOTE — ANESTHESIA PREPROCEDURE EVALUATION
Relevant Problems No relevant active problems Anesthetic History No history of anesthetic complications Review of Systems / Medical History Patient summary reviewed, nursing notes reviewed and pertinent labs reviewed Pulmonary Within defined limits Neuro/Psych Within defined limits Cardiovascular Hypertension: well controlled CAD Comments: Recent negative cath - clearance from cardiologist in chart GI/Hepatic/Renal 
Within defined limits Endo/Other Hypothyroidism Arthritis Other Findings Physical Exam 
 
Airway Mallampati: II 
TM Distance: 4 - 6 cm Neck ROM: normal range of motion Mouth opening: Normal 
 
 Cardiovascular Dental 
 
Dentition: Full lower dentures and Full upper dentures Pulmonary Abdominal 
 
 
 
 Other Findings Anesthetic Plan ASA: 2 Anesthesia type: general 
 
 
Post-op pain plan if not by surgeon: peripheral nerve block single Induction: Intravenous Anesthetic plan and risks discussed with: Patient and Son / Daughter Adductor canal block - risks/benefits explained: infection, nerve injury, bleeding, failed block - she wishes to proceed.

## 2019-04-29 NOTE — PERIOP NOTES
TRANSFER - OUT REPORT: 
 
Verbal report given to Timoteo Paredes RN (name) on Flaquita Mackey  being transferred to [address] [place] [address] (unit) for routine post - op Report consisted of patients Situation, Background, Assessment and  
Recommendations(SBAR). Information from the following report(s) SBAR, Kardex, OR Summary, Intake/Output and MAR was reviewed with the receiving nurse. Lines:  
Peripheral IV 20/35/68 Right Cephalic (Active) Site Assessment Clean, dry, & intact 4/29/2019  3:53 PM  
Phlebitis Assessment 0 4/29/2019  3:53 PM  
Infiltration Assessment 0 4/29/2019  3:53 PM  
Dressing Status Clean, dry, & intact 4/29/2019  3:53 PM  
Dressing Type Transparent 4/29/2019  3:53 PM  
Hub Color/Line Status Green; Infusing;Patent 4/29/2019 10:44 AM  
Alcohol Cap Used No 4/29/2019 10:44 AM  
  
 
Opportunity for questions and clarification was provided. Patient transported with: 
 O2 @ 2 liters Registered Nurse

## 2019-04-29 NOTE — INTERVAL H&P NOTE
H&P Update: 
Jose M Carroll was seen and examined. History and physical has been reviewed. The patient has been examined. There have been no significant clinical changes since the completion of the originally dated History and Physical. 
Patient identified by surgeon; surgical site was confirmed by patient and surgeon.

## 2019-04-29 NOTE — ROUTINE PROCESS
TRANSFER - IN REPORT: 
 
Verbal report received from GHULAM Hirsch RN(name) on Flaquita Mackey  being received from PACU for routine post - op. Report consisted of patients Situation, Background, Assessment and  
Recommendations(SBAR). Information from the following report(s) SBAR, Kardex, OR Summary, Intake/Output and MAR was reviewed with the receiving nurse. Opportunity for questions and clarification was provided. Assessment to be completed upon patients arrival to unit and care assumed.

## 2019-04-29 NOTE — BRIEF OP NOTE
BRIEF OPERATIVE NOTE Date of Procedure: 4/29/2019 Preoperative Diagnosis: MECHANICAL LOOSENING OF INTERNAL LEFT KNEE PROSTHETIS JOINT, INITIAL ENCOUNTER Postoperative Diagnosis: MECHANICAL LOOSENING OF INTERNAL LEFT KNEE PROSTHETIS JOINT, INITIAL ENCOUNTER Procedure(s): LEFT KNEE: REVISION TOTAL KNEE REPLACEMENT Surgeon(s) and Role: Mak Koch MD - Primary Surgical Assistant: Saran Gross PA-C Surgical Staff: 
Circ-1: Greta Cary RN 
Circ-Relief: Ailyn Quintana RN Physician Assistant: Leonardo Escamilla PA-C Scrub Tech-1: Sofi Suarez Scrub RN-1: Raul Rey RN Event Time In Time Out Incision Start 9822 [id number] Incision Close 1828 Anesthesia: General  
Estimated Blood Loss: 250mL Specimens: * No specimens in log * Findings: Failed left total knee arthroplasty Complications: None Implants:  
Implant Name Type Inv. Item Serial No.  Lot No. LRB No. Used Action CEMENT BNE SIMPLEX TOBRA 4 --  - [id number]  CEMENT BNE SIMPLEX TOBRA 4 --   [employer] [facility] [id number] Left 3 Implanted FEM CONSTRND OXIN LEGION 4 LT --  - [id number]  FEM CONSTRND OXIN LEGION 4 LT --   [employer] [id number] Left 1 Implanted STEM KNE PRES FT LEGION [id number] --  - [id number]  STEM KNE PRES FT LEGION [id number] --   [employer] [id number] Left 1 Implanted  OFFST 4MM LEGION --  - [id number]   OFFST 4MM LEGION --   [facility] [employer] [id number] Left 1 Implanted LEGION SCREW ON FEMORAL WEDGE    [employer] [id number] Left 1 Implanted WEDGE FEM DSTL SCR SZ 4 5MM -- LEGION - [id number]  WEDGE FEM DSTL SCR SZ 4 5MM -- Veda Gaitan [employer] [id number] Left 1 Implanted CONSTRAINED ARTICULAR INSERT    [employer] [id number] Left 1 Implanted BASEPLT TIB REV LEGION SZ 5 LT --  - [id number]  BASEPLT TIB REV LEGION SZ 5 LT --   [employer] [id number] Left 1 Implanted

## 2019-04-29 NOTE — ANESTHESIA PROCEDURE NOTES
Peripheral Block    Start time: 4/29/2019 11:44 AM  End time: 4/29/2019 11:48 AM  Performed by: Scott Perdomo MD  Authorized by: Scott Perdomo MD       Pre-procedure: Indications: at surgeon's request and post-op pain management    Preanesthetic Checklist: patient identified, risks and benefits discussed, site marked, timeout performed, anesthesia consent given and patient being monitored    Timeout Time: 11:44          Block Type:   Block Type: Adductor canal  Laterality:  Left  Monitoring:  Standard ASA monitoring, continuous pulse ox, frequent vital sign checks, heart rate, responsive to questions and oxygen  Injection Technique:  Single shot  Procedures: ultrasound guided    Patient Position: supine  Prep: chlorhexidine    Location:  Mid thigh  Needle Type:  Stimuplex  Needle Gauge:  20 G  Needle Localization:  Ultrasound guidance    Assessment:  Number of attempts:  1  Injection Assessment:  Incremental injection every 5 mL, no paresthesia, local visualized surrounding nerve on ultrasound, negative aspiration for blood, no intravascular symptoms and ultrasound image on chart  Patient tolerance:  Patient tolerated the procedure well with no immediate complications  No sedation as patient was dozing off prior to procedure.

## 2019-04-29 NOTE — PROGRESS NOTES
Pt transferred to room 205. Pt stable. Dressing CDI. Rhonda Cherry, RN at bedside. 04/29/19 1953 Vitals Temp 98.6 °F (37 °C) Temp Source Oral  
Pulse (Heart Rate) (!) 57 Resp Rate 14  
O2 Sat (%) 100 % Level of Consciousness Alert /65 MAP (Calculated) 91  
MEWS Score 0

## 2019-04-30 LAB
ANION GAP SERPL CALC-SCNC: 6 MMOL/L (ref 3–18)
BUN SERPL-MCNC: 21 MG/DL (ref 7–18)
BUN/CREAT SERPL: 16 (ref 12–20)
CALCIUM SERPL-MCNC: 7.5 MG/DL (ref 8.5–10.1)
CHLORIDE SERPL-SCNC: 107 MMOL/L (ref 100–108)
CO2 SERPL-SCNC: 29 MMOL/L (ref 21–32)
CREAT SERPL-MCNC: 1.35 MG/DL (ref 0.6–1.3)
GLUCOSE SERPL-MCNC: 90 MG/DL (ref 74–99)
HCT VFR BLD AUTO: 26.5 % (ref 35–45)
HGB BLD-MCNC: 8.4 G/DL (ref 12–16)
POTASSIUM SERPL-SCNC: 3.8 MMOL/L (ref 3.5–5.5)
SODIUM SERPL-SCNC: 142 MMOL/L (ref 136–145)

## 2019-04-30 PROCEDURE — 80048 BASIC METABOLIC PNL TOTAL CA: CPT

## 2019-04-30 PROCEDURE — 65270000029 HC RM PRIVATE

## 2019-04-30 PROCEDURE — 36415 COLL VENOUS BLD VENIPUNCTURE: CPT

## 2019-04-30 PROCEDURE — 97116 GAIT TRAINING THERAPY: CPT

## 2019-04-30 PROCEDURE — 97165 OT EVAL LOW COMPLEX 30 MIN: CPT

## 2019-04-30 PROCEDURE — 74011250637 HC RX REV CODE- 250/637: Performed by: PHYSICIAN ASSISTANT

## 2019-04-30 PROCEDURE — 85018 HEMOGLOBIN: CPT

## 2019-04-30 PROCEDURE — 97161 PT EVAL LOW COMPLEX 20 MIN: CPT

## 2019-04-30 PROCEDURE — 97535 SELF CARE MNGMENT TRAINING: CPT

## 2019-04-30 PROCEDURE — 74011250636 HC RX REV CODE- 250/636: Performed by: PHYSICIAN ASSISTANT

## 2019-04-30 PROCEDURE — 97530 THERAPEUTIC ACTIVITIES: CPT

## 2019-04-30 RX ADMIN — ASPIRIN 81 MG: 81 TABLET, COATED ORAL at 08:28

## 2019-04-30 RX ADMIN — OXYCODONE AND ACETAMINOPHEN 2 TABLET: 5; 325 TABLET ORAL at 20:36

## 2019-04-30 RX ADMIN — CEFAZOLIN SODIUM 2 G: 2 SOLUTION INTRAVENOUS at 00:32

## 2019-04-30 RX ADMIN — LEVOTHYROXINE SODIUM 50 MCG: 50 TABLET ORAL at 06:55

## 2019-04-30 RX ADMIN — SODIUM CHLORIDE, SODIUM LACTATE, POTASSIUM CHLORIDE, AND CALCIUM CHLORIDE 75 ML/HR: 600; 310; 30; 20 INJECTION, SOLUTION INTRAVENOUS at 06:56

## 2019-04-30 RX ADMIN — CEFAZOLIN SODIUM 2 G: 2 SOLUTION INTRAVENOUS at 08:29

## 2019-04-30 RX ADMIN — FERROUS SULFATE TAB 325 MG (65 MG ELEMENTAL FE) 325 MG: 325 (65 FE) TAB at 08:28

## 2019-04-30 RX ADMIN — OXYCODONE AND ACETAMINOPHEN 2 TABLET: 5; 325 TABLET ORAL at 03:56

## 2019-04-30 RX ADMIN — OXYCODONE AND ACETAMINOPHEN 2 TABLET: 5; 325 TABLET ORAL at 16:22

## 2019-04-30 RX ADMIN — LISINOPRIL: 20 TABLET ORAL at 08:28

## 2019-04-30 RX ADMIN — OXYCODONE AND ACETAMINOPHEN 2 TABLET: 5; 325 TABLET ORAL at 08:48

## 2019-04-30 RX ADMIN — ASPIRIN 81 MG: 81 TABLET, COATED ORAL at 20:35

## 2019-04-30 NOTE — PROGRESS NOTES
DC Plan: Discharge home with [facility] [address] vs SNF, pending therapy recommendations Chart reviewed. Pt admitted by MD Kel Tsai. Met with pt and pts family members at bedside. Discussed transition of care. Pt states she needs to go to rehab, awaiting therapy notes. FOC offered for both [place] and SNF. Pt chose [facility] [address], if she clears therapy. Pt chose [facility] for SNF. If pt requires SNF placement, she will need 3 inpatient midnights to use medicare benefit. Referrals for [place] and SNF placed with Nellie Rodriguez for assistance. Pt aware if [facility] unable to accommodate she would need to chose another SNF. Pt and family provided SNF list to review. Pt lives alone. Pt has cane, RW. Questions addressed. CM will cont to follow for transition of care needs. [address] Spoke again with pt and family. They would like referrals placed with [facility] and [facility]. [facility] is first choice. Transition of Care (MICHELL) Plan:     
 
  Pt admitted for an elective surgical procedure. Pt is independent. Please encourage ambulation. No plan of care needs identified. Anticipate pt will be medically stable for discharge within the next 24-48 hours. CM available to assist as needed. MICHELL Transportation: How is patient being transported at discharge? Family/Friend When? Once cleared by physician Is transport scheduled? N/A Follow-up appointment and transportation: PCP/Specialist?  See AVS for Appointment Who is transporting to the follow-up appointment? Self/Family/Friend Is transport for follow up appointment scheduled? N/A Communication plan (with patient/family): Who is being called? Patient or Next of Kin? Responsible party? Patient What number(s) is to be used? See [employer] What service provider [facility]? When are they calling? Readmission Risk? (Green/Low; Yellow/Moderate; Red/High):  Nina Khalil Care Management Interventions PCP Verified by CM: Yes Transition of Care Consult (CM Consult): Discharge Planning, Home Health,  [address]: No 
Reason Outside [place]: Physician referred to specific agency Physical Therapy Consult: Yes Current Support Network: Lives Alone Plan discussed with Pt/Family/Caregiver: Yes Freedom of Choice Offered: Yes Discharge Location Discharge Placement: Skilled nursing facility([facility])

## 2019-04-30 NOTE — ACP (ADVANCE CARE PLANNING)
AMD - Not Interested   addressed Advance Medical Directives with the patient. Patient is not interested at this time.  completed visit with patient and offered Pastoral care. Chaplains will continue to follow and will provide pastoral care as needed or requested. Rev.  [address] [facility]  [phone]

## 2019-04-30 NOTE — PROGRESS NOTES
5148 
Assumed care of patient at this time, assessment complete. Patient alert and oriented x 4. Denies SOB and chest pain. Patient lungs clear bilaterally. Cap refilled  less than 3 seconds. Patient denies numbness and tingling to all extremities. Stated pain 0/10. Patient has 18g IV to right arm. Dressing to left knee, clean, dry, intact and ACE bandage . TEDs and PLEXIs applied to BLE. Patient encouraged to use IS. Patient verbalized understanding. Ice pack in place, call light and personal items in reach, bed in low position and locked, will continue to monitor patient. Fall risk arm band in place.

## 2019-04-30 NOTE — PROGRESS NOTES
Progress Note Patient: Jarek Menendez MRN: [id number]  SSN: [id number] YOB: 1935  Age: 81 y.o. Sex: female POD:    1 Day Post-Op S/P:    Procedure(s): LEFT KNEE: REVISION TOTAL KNEE REPLACEMENT Subjective:  
Pt is without complaints of pain. Objective:  
 
Patient Vitals for the past 12 hrs: 
 BP Temp Pulse Resp SpO2  
04/30/19 0333 135/56 98.3 °F (36.8 °C) (!) 58 15 100 % 04/29/19 2331 116/46 98.4 °F (36.9 °C) 62 13 100 % 04/29/19 2237 115/53 98.5 °F (36.9 °C) 66 14 100 % 04/29/19 2118 152/58 98.2 °F (36.8 °C) (!) 55 14 100 % 04/29/19 1953 143/65 98.6 °F (37 °C) (!) 57 14 100 % 04/29/19 1940 149/62  (!) 56 12 100 % 04/29/19 1935 143/61  (!) 56 12 100 % 04/29/19 1930 147/60  (!) 58 12 100 % 04/29/19 1925 145/63  (!) 57 14 100 % 04/29/19 1920 150/62  60 14 100 % 04/29/19 1915 141/61  61 14 100 % 04/29/19 1910 143/63  64 16 100 % Recent Labs 04/30/19 
9343 HGB 8.4* HCT 26.5*   
K 3.8  CO2 29 BUN 21* CREA 1.35* GLU 90 Pt resting in bed. Lower extremity operative dressing clean/dry/intact. Neurovascularly intact. Assessment:  
 
Awake and alert. In good spirits. X rays satisfactory. Probable rehab. Plan: 1. Continue pain management/ ice to operative area. 2. Continue to progress with PT/OT. 3. Discharge planning to skilled nursing facility vs. inpatient rehab.

## 2019-04-30 NOTE — PROGRESS NOTES
Problem: Self Care Deficits Care Plan (Adult) Goal: *Acute Goals and Plan of Care (Insert Text) Description Occupational Therapy Goals Initiated 4/30/2019 within 7 day(s). 1.  Patient will perform lower body dressing with supervision/set-up 2. Patient will perform bathing with supervision/set-up. 3.  Patient will perform toilet transfers with supervision/set-up. 4.  Patient will perform all aspects of toileting with supervision/set-up. 5.  Patient will complete standing with supervision/set-up for 5 minutes during ADL to increase activity tolerance for functional activity. Outcome: Progressing Towards Goal 
  
 
OCCUPATIONAL THERAPY EVALUATION Patient: Josefa Potter (3 y.o. female) Date: 4/30/2019 Primary Diagnosis: Failed total knee arthroplasty ([facility] [address] Rosalio Lewis [address] 656 Procedure(s) (LRB): LEFT KNEE: REVISION TOTAL KNEE REPLACEMENT (Left) 1 Day Post-Op Precautions: Fall, WBAT 
 
ASSESSMENT : 
Based on the objective data described below, the patient presents with left TKA revision. Pt completed transfers and functional mobility with min assist using RW. Pt min assist for LB dressing and toileting this session. Pt lives alone but has family support. Pt education on home safety, fall prevention and ADL technique and demonstrated understanding through verbal feedback. Pt could benefit from OT to increase I with ADLs, transfers, mobility, activity tolerance and strength for functional activity. Patient will benefit from skilled intervention to address the above impairments. Patient?s rehabilitation potential is considered to be Good Factors which may influence rehabilitation potential include: ? None noted ? Mental ability/status ? Medical condition ? Home/family situation and support systems ? Safety awareness ? Pain tolerance/management ? Other: PLAN : 
 Recommendations and Planned Interventions: 
?               Self Care Training                  ? Therapeutic Activities ? Functional Mobility Training    ? Cognitive Retraining 
? Therapeutic Exercises           ? Endurance Activities ? Balance Training                   ? Neuromuscular Re-Education ? Visual/Perceptual Training     ? Home Safety Training 
? Patient Education                 ? Family Training/Education ? Other (comment): Frequency/Duration: Patient will be followed by occupational therapy 1-2 times per day/4-7 days per week to address goals. Discharge Recommendations: Rehab Further Equipment Recommendations for Discharge: N/A  
 
SUBJECTIVE:  
Patient stated ? I could use the bathroom. ? OBJECTIVE DATA SUMMARY:  
 
Past Medical History:  
Diagnosis Date Arthritis CAD (coronary artery disease) Athrosclerotic, recent cardiac cath 2019 Hypertension many years Ill-defined condition   
 varicose veins bilateral  
 Thyroid disease many years Past Surgical History:  
Procedure Laterality Date HX CATARACT REMOVAL    
 HX HEART CATHETERIZATION  04/2019 HX HYSTERECTOMY HX ORTHOPAEDIC Left   
 hip replacement HX ORTHOPAEDIC Bilateral   
 knee replacement HX ORTHOPAEDIC Right   
 thr  
 
Barriers to Learning/Limitations: None Compensate with: visual, verbal, tactile, kinesthetic cues/model Prior Level of Function/Home Situation: I with ADLs prior to admission and using RW for mobility Home Situation Home Environment: Private residence # Steps to Enter: 3 Rails to Enter: Yes Hand Rails : Right One/Two Story Residence: One story Living Alone: Yes Support Systems: Family member(s) Patient Expects to be Discharged to[de-identified] Rehabilitation facility Current DME Used/Available at Home: Raised toilet seat, Shower chair Tub or Shower Type: Shower ? Right hand dominant   ? Left hand dominant Cognitive/Behavioral Status: 
Neurologic State: Alert; Appropriate for age Orientation Level: Oriented X4 Cognition: Appropriate decision making; Appropriate for age attention/concentration; Appropriate safety awareness; Follows commands Safety/Judgement: Awareness of environment; Fall prevention Skin: incision on left LE covered with dressing Edema: min edema noted on left LE Vision/Perceptual:   
Corrective Lenses: Glasses Coordination: 
Coordination: Within functional limits Fine Motor Skills-Upper: Left Intact; Right Intact Gross Motor Skills-Upper: Left Intact; Right Intact Balance: 
Sitting: Intact Standing: Intact; With support Strength: 
Strength: Within functional limits Tone & Sensation: 
Tone: Normal 
Sensation: Intact Range of Motion: 
AROM: Within functional limits Functional Mobility and Transfers for ADLs: 
Bed Mobility: 
Supine to Sit: Minimum assistance Sit to Supine: Minimum assistance Scooting: Contact guard assistance Transfers: 
Sit to Stand: Minimum assistance Toilet Transfer : Minimum assistance ADL Assessment: 
Upper Body Dressing: Supervision Lower Body Dressing: Minimum assistance Toileting: Minimum assistance ADL Intervention: 
Cognitive Retraining Safety/Judgement: Awareness of environment; Fall prevention Pain: 
Pain Scale 1: Numeric (0 - 10) Pain Intensity 1: 1 Pain Location 1: Knee Pain Orientation 1: Left Pain Description 1: Trevon Shank Pain Intervention(s) 1: Medication (see MAR) Activity Tolerance:  
Fair + Please refer to the flowsheet for vital signs taken during this treatment. After treatment:  
? Patient left in no apparent distress sitting up in chair ? Patient left in no apparent distress in bed 
? Call bell left within reach ? Nursing notified ? Caregiver present ? Bed alarm activated COMMUNICATION/EDUCATION:  
 ? Home safety education was provided and the patient/caregiver indicated understanding. ? Patient/family have participated as able in goal setting and plan of care. ? Patient/family agree to work toward stated goals and plan of care. ? Patient understands intent and goals of therapy, but is neutral about his/her participation. ? Patient is unable to participate in goal setting and plan of care. Thank you for this referral. 
Aloknce Evans, OTR/L Time Calculation: 26 mins

## 2019-04-30 NOTE — PROGRESS NOTES
5 - Patient arrives to unit at this time. Admission completed at this time. Patient is A/O x 4, 2L NC. Denies chest pain and SOB. 18G IV to right cephalic  intact and patent. Plexi compression device bilaterally. ANA applied to RLE. ACE dressing to LLE CDI. Denies numbness/tingling/calf pain. Pain 6/10 with a tolerable level of 5/10, ice applied. Pt educated on IS use, q2h rounds, pain management, CHG wipes and \"Up for Meals\". Pt verbalized understanding, no concerns voiced. Call bell within reach, bed in lowest position. Pt encouraged to call for assistance. 2118 - Patient rated pain 6/10, pain medication administered per MAR. No other concerns voiced at this time. Call bell within reach, bed in lowest position. Pt encouraged to use call bell for any needs. 7705 - Patient ambulated OOB to [facility] with steady gait with x2 assist. CHG wipes completed with assistance from Jarad Cadet. Pain rated 7/10, pain medication administered per MAR. No concerns voiced. Call bell within reach, bed in lowest position. Pt encouraged to call for assistance.

## 2019-04-30 NOTE — ROUTINE PROCESS
Bedside and Verbal shift change report given to ALFREDO Navas RN by Jesus Waldron RN. Report included the following information SBAR, Kardex, OR Summary, Intake/Output and MAR.

## 2019-04-30 NOTE — ROUTINE PROCESS
Bedside and Verbal shift change report given to WALDEMAR Lee RN (oncoming nurse) by Cookie Shaffer RN (offgoing nurse). Report included the following information SBAR, Kardex, MAR and Recent Results.

## 2019-04-30 NOTE — PROGRESS NOTES
Progress Note Patient: Gilda Ferreira MRN: [id number]  SSN: [id number] YOB: 1935  Age: 81 y.o. Sex: female POD:    1 Day Post-Op S/P:    Procedure(s): LEFT KNEE: REVISION TOTAL KNEE REPLACEMENT Subjective:  
Pt is without complaints of pain. Patient states that she has not been up to move yet. She does state that her knee feels \"stiff\" but she is not in much pain. Objective:  
 
Patient Vitals for the past 12 hrs: 
 BP Temp Pulse Resp SpO2  
04/30/19 0714 120/53 98.7 °F (37.1 °C) 65 15 96 % 04/30/19 0333 135/56 98.3 °F (36.8 °C) (!) 58 15 100 % 04/29/19 2331 116/46 98.4 °F (36.9 °C) 62 13 100 % 04/29/19 2237 115/53 98.5 °F (36.9 °C) 66 14 100 % 04/29/19 2118 152/58 98.2 °F (36.8 °C) (!) 55 14 100 % Recent Labs 04/30/19 
3578 HGB 8.4* HCT 26.5*   
K 3.8  CO2 29 BUN 21* CREA 1.35* GLU 90 Pt. resting in bed. Lower extremity operative dressing clean/dry/intact. Neurovascularly intact. Assessment:  
 
Awake and alert. In good spirits. Plan: 1. Continue pain management/ ice to operative area. 2. Continue to progress with PT/OT. 3. Discharge planning to skilled nursing facility vs. inpatient rehab.

## 2019-04-30 NOTE — PROGRESS NOTES
Problem: Falls - Risk of 
Goal: *Absence of Falls Description Document Brett Callejas Fall Risk and appropriate interventions in the flowsheet. Outcome: Progressing Towards Goal 
  
Problem: Patient Education: Go to Patient Education Activity Goal: Patient/Family Education Outcome: Progressing Towards Goal 
  
Problem: Pain Goal: *Control of Pain Outcome: Progressing Towards Goal 
  
Problem: Patient Education: Go to Patient Education Activity Goal: Patient/Family Education Outcome: Progressing Towards Goal 
  
Problem: Patient Education: Go to Patient Education Activity Goal: Patient/Family Education Outcome: Progressing Towards Goal 
  
Problem: Knee Replacement: Day of Surgery/Unit Goal: Activity/Safety Outcome: Progressing Towards Goal 
Goal: Consults, if ordered Outcome: Progressing Towards Goal 
Goal: Diagnostic Test/Procedures Outcome: Progressing Towards Goal 
Goal: Nutrition/Diet Outcome: Progressing Towards Goal 
Goal: Medications Outcome: Progressing Towards Goal 
Goal: Respiratory Outcome: Progressing Towards Goal 
Goal: Treatments/Interventions/Procedures Outcome: Progressing Towards Goal 
Goal: Psychosocial 
Outcome: Progressing Towards Goal 
Goal: *Initiate mobility Outcome: Progressing Towards Goal 
Goal: *Optimal pain control at patient's stated goal 
Outcome: Progressing Towards Goal 
Goal: *Hemodynamically stable Outcome: Progressing Towards Goal

## 2019-04-30 NOTE — PROGRESS NOTES
Problem: Mobility Impaired (Adult and Pediatric) Goal: *Acute Goals and Plan of Care (Insert Text) Description In 1-7 days pt will be able to perform: ST.  Bed mobility:  Rolling L to R to L modified independent for positioning. 2.  Supine to sit to supine S with HR for meals. 3.  Sit to stand to sit S with RW in prep for ambulation. LT.  Gait:  Ambulate >150ft S with RW, WBAT, for home/community mobility. 2.  Activity tolerance: Tolerate up in chair 1-2 hours for ADL?s. 
3.  Patient/Family Education:  Patient/family to be independent with HEP for follow-up care and safe discharge. Outcome: Progressing Towards Goal 
 PHYSICAL THERAPY TREATMENT Patient: Dipak Bonilla (89 y.o. female) Date: 2019 Diagnosis: Failed total knee arthroplasty ([facility] [address] [T84.018A, Z96.659] <principal problem not specified> Procedure(s) (LRB): LEFT KNEE: REVISION TOTAL KNEE REPLACEMENT (Left) 1 Day Post-Op Precautions: Fall, WBAT Chart, physical therapy assessment, plan of care and goals were reviewed. ASSESSMENT: 
Pt supine in bed upon arrival. With movement, pt reporting increase pain /10. Pt unable to increase ambulation distance at this time due to increase pain. Assisted pt to restroom. CGA for lizette care. VCs for correct hand placement, sequencing and safety. Pt remained sitting in chair post tx. Nursing aware of above. Cont POC. Progression toward goals: 
?      Improving appropriately and progressing toward goals ? Improving slowly and progressing toward goals ? Not making progress toward goals and plan of care will be adjusted PLAN: 
Patient continues to benefit from skilled intervention to address the above impairments. Continue treatment per established plan of care. Discharge Recommendations:  Rehab Further Equipment Recommendations for Discharge:  rolling walker SUBJECTIVE:  
Patient stated ? The pain is up there ? OBJECTIVE DATA SUMMARY:  
Critical Behavior: Neurologic State: Alert, Appropriate for age Orientation Level: Oriented X4 Cognition: Appropriate decision making, Appropriate for age attention/concentration, Appropriate safety awareness, Follows commands Safety/Judgement: Awareness of environment, Fall prevention Functional Mobility Training: 
Bed Mobility: 
Supine to Sit: Minimum assistance; Additional time Sit to Supine: Minimum assistance Scooting: Contact guard assistance; Additional time Transfers: 
Sit to Stand: Minimum assistance; Moderate assistance; Additional time Stand to Sit: Minimum assistance Balance: 
Sitting: Intact Standing: Intact; With support Ambulation/Gait Training: 
Distance (ft): 40 Feet (ft) Assistive Device: Walker, rolling;Gait belt Ambulation - Level of Assistance: Contact guard assistance Gait Abnormalities: Antalgic;Decreased step clearance; Step to gait Left Side Weight Bearing: As tolerated Base of Support: Widened Stance: Left decreased Speed/Beena: Slow Step Length: Right shortened;Left shortened Swing Pattern: Right asymmetrical;Left asymmetrical 
Interventions: Verbal cues Pain: 
Pain Scale 1: Numeric (0 - 10) Pain Intensity 1: 10 
Pain Location 1: Knee Pain Orientation 1: Left Pain Description 1: Chey Kike Pain Intervention(s) 1: Medication (see MAR) Activity Tolerance:  
Fair After treatment:  
? Patient left in no apparent distress sitting up in chair ? Patient left in no apparent distress in bed 
? Call bell left within reach ? Nursing notified ? Caregiver present ? Bed alarm activated Gil Garcia PTA Time Calculation: 30 mins

## 2019-04-30 NOTE — PROGRESS NOTES
Problem: Mobility Impaired (Adult and Pediatric) Goal: *Acute Goals and Plan of Care (Insert Text) Description In 1-7 days pt will be able to perform: ST.  Bed mobility:  Rolling L to R to L modified independent for positioning. 2.  Supine to sit to supine S with HR for meals. 3.  Sit to stand to sit S with RW in prep for ambulation. LT.  Gait:  Ambulate >150ft S with RW, WBAT, for home/community mobility. 2.  Activity tolerance: Tolerate up in chair 1-2 hours for ADL?s. 
3.  Patient/Family Education:  Patient/family to be independent with HEP for follow-up care and safe discharge. Note: PHYSICAL THERAPY EVALUATION Patient: Yuan Segal (29 y.o. female) Date: 2019 Primary Diagnosis: Failed total knee arthroplasty ([facility] [address], [address] Procedure(s) (LRB): LEFT KNEE: REVISION TOTAL KNEE REPLACEMENT (Left) 1 Day Post-Op Precautions:   Fall, WBAT 
 
ASSESSMENT : 
Based on the objective data described below, the patient presents with decreased functional mobility and independence in regard to bed mobility, transfers, gt quality and tolerance, L knee AROM, L knee strength, pain, balance, activity tolerance, stair negotiation and safety due to recent L TKA surgery. Pt rating pain on numerical pain scale 5/10. Pt required min A for supine<>sit and min/mod A for sit<>stand w/ bed elevated for success. Pt required vc for safe techniques. Pt able to participate in gt training w/ RW, WBAT, GB and CGA into hallway w/ antalgic pattern. Pt required additional time for all mobility. Pt returned to supine in bed w/ all needs within reach, SCDs applied and ice pack to L knee. Nurse Laxmi aware and family present. Recommend rehab upon hospital d/c. Patient will benefit from skilled intervention to address the above impairments. Patient?s rehabilitation potential is considered to be Good Factors which may influence rehabilitation potential include:  
? None noted ?         Mental ability/status ? Medical condition ? Home/family situation and support systems ? Safety awareness 
? Pain tolerance/management 
? Other: PLAN : 
Recommendations and Planned Interventions: 
?           Bed Mobility Training             ? Neuromuscular Re-Education ? Transfer Training                   ? Orthotic/Prosthetic Training 
? Gait Training                          ? Modalities ? Therapeutic Exercises          ? Edema Management/Control ? Therapeutic Activities            ? Patient and Family Training/Education ? Other (comment): Frequency/Duration: Patient will be followed by physical therapy 1-2 times per day/4-7 days per week to address goals. Discharge Recommendations: Rehab Further Equipment Recommendations for Discharge: N/A  
 
SUBJECTIVE:  
Patient stated ? Are you going to hurt me?? OBJECTIVE DATA SUMMARY:  
 
Past Medical History:  
Diagnosis Date Arthritis CAD (coronary artery disease) Athrosclerotic, recent cardiac cath 2019 Hypertension many years Ill-defined condition   
 varicose veins bilateral  
 Thyroid disease many years Past Surgical History:  
Procedure Laterality Date HX CATARACT REMOVAL    
 HX HEART CATHETERIZATION  04/2019 HX HYSTERECTOMY HX ORTHOPAEDIC Left   
 hip replacement HX ORTHOPAEDIC Bilateral   
 knee replacement HX ORTHOPAEDIC Right   
 thr  
 
Barriers to Learning/Limitations: None Compensate with: visual, verbal, tactile, kinesthetic cues/model Prior Level of Function/Home Situation:  
Home Situation Home Environment: Private residence # Steps to Enter: 3 Rails to Enter: Yes Hand Rails : Right One/Two Story Residence: One story Living Alone: Yes Support Systems: Family member(s) Patient Expects to be Discharged to[de-identified] Rehabilitation facility Current DME Used/Available at Home: Walker, rolling Critical Behavior: 
Neurologic State: Alert; Appropriate for age Orientation Level: Oriented X4 Cognition: Appropriate decision making; Appropriate for age attention/concentration; Appropriate safety awareness; Follows commands Safety/Judgement: Awareness of environment Psychosocial 
Patient Behaviors: Calm; Cooperative Purposeful Interaction: Yes Pt Identified Daily Priority: Clinical issues (comment) Caritas Process: Nurture loving kindness Caring Interventions: Reassure Reassure: Therapeutic listening Skin Condition/Temp: Dry;Warm 
Skin Integrity: Incision (comment)(L knee) Skin Integumentary Skin Color: Appropriate for ethnicity Skin Condition/Temp: Dry;Warm 
Skin Integrity: Incision (comment)(L knee) Turgor: Non-tenting Hair Growth: Present Varicosities: Present Strength:   
Strength: Generally decreased, functional 
Tone & Sensation:  
Tone: Normal 
Sensation: Intact Range Of Motion: 
AROM: Generally decreased, functional 
Functional Mobility: 
Bed Mobility: 
Supine to Sit: Minimum assistance; Additional time(vc) 
Sit to Supine: Minimum assistance; Additional time(vc) 
Scooting: Contact guard assistance; Additional time(vc) 
Transfers: 
Sit to Stand: Minimum assistance; Moderate assistance; Additional time(vc; bed elevated) Stand to Sit: Minimum assistance; Additional time(vc) 
Balance:  
Sitting: Intact Standing: Intact; With support Ambulation/Gait Training: 
Distance (ft): 34 Feet (ft) Assistive Device: Walker, rolling;Gait belt Ambulation - Level of Assistance: Contact guard assistance(vc) 
Gait Abnormalities: Antalgic;Decreased step clearance; Step to gait Left Side Weight Bearing: As tolerated Base of Support: Shift to right Stance: Left decreased Speed/Beena: Slow Step Length: Left shortened;Right shortened Swing Pattern: Right asymmetrical;Left asymmetrical 
Interventions: Safety awareness training; Tactile cues; Verbal cues;Visual/Demos Therapeutic Exercises: HEP written copy issued to pt per MD protocol. Participated in ankle pumps, quad sets, heel slides, LAQ L x5 reps each. Pain: 
Pain Scale 1: Numeric (0 - 10) Pain Intensity 1: 5 Pain Location 1: Knee Pain Orientation 1: Left Pain Description 1: Mckayla Tierney Pain Intervention(s) 1: Medication (see MAR) Activity Tolerance:  
Fair Please refer to the flowsheet for vital signs taken during this treatment. After treatment:  
?         Patient left in no apparent distress sitting up in chair ? Patient left in no apparent distress in bed 
? Call bell left within reach ? Nursing notified ? Caregiver present ? Bed alarm activated COMMUNICATION/EDUCATION:  
?         Fall prevention education was provided and the patient/caregiver indicated understanding. ? Patient/family have participated as able in goal setting and plan of care. ?         Patient/family agree to work toward stated goals and plan of care. ?         Patient understands intent and goals of therapy, but is neutral about his/her participation. ? Patient is unable to participate in goal setting and plan of care. Thank you for this referral. 
Kali Mitchell, PT Time Calculation: 29 mins Eval Complexity: History: HIGH Complexity :3+ comorbidities / personal factors will impact the outcome/ POC Exam:MEDIUM Complexity : 3 Standardized tests and measures addressing body structure, function, activity limitation and / or participation in recreation  Presentation: LOW Complexity : Stable, uncomplicated  Clinical Decision Making:Low Complexity    Overall Complexity:LOW

## 2019-05-01 VITALS
TEMPERATURE: 98.4 F | SYSTOLIC BLOOD PRESSURE: 130 MMHG | DIASTOLIC BLOOD PRESSURE: 60 MMHG | BODY MASS INDEX: 34.87 KG/M2 | OXYGEN SATURATION: 100 % | HEIGHT: 64 IN | HEART RATE: 73 BPM | RESPIRATION RATE: 16 BRPM

## 2019-05-01 LAB
ANION GAP SERPL CALC-SCNC: 7 MMOL/L (ref 3–18)
BUN SERPL-MCNC: 21 MG/DL (ref 7–18)
BUN/CREAT SERPL: 18 (ref 12–20)
CALCIUM SERPL-MCNC: 8.4 MG/DL (ref 8.5–10.1)
CHLORIDE SERPL-SCNC: 106 MMOL/L (ref 100–108)
CO2 SERPL-SCNC: 27 MMOL/L (ref 21–32)
CREAT SERPL-MCNC: 1.18 MG/DL (ref 0.6–1.3)
GLUCOSE SERPL-MCNC: 123 MG/DL (ref 74–99)
HCT VFR BLD AUTO: 30.1 % (ref 35–45)
HGB BLD-MCNC: 9.5 G/DL (ref 12–16)
POTASSIUM SERPL-SCNC: 3.7 MMOL/L (ref 3.5–5.5)
SODIUM SERPL-SCNC: 140 MMOL/L (ref 136–145)

## 2019-05-01 PROCEDURE — 74011250637 HC RX REV CODE- 250/637: Performed by: PHYSICIAN ASSISTANT

## 2019-05-01 PROCEDURE — 97116 GAIT TRAINING THERAPY: CPT

## 2019-05-01 PROCEDURE — 85018 HEMOGLOBIN: CPT

## 2019-05-01 PROCEDURE — 36415 COLL VENOUS BLD VENIPUNCTURE: CPT

## 2019-05-01 PROCEDURE — 97530 THERAPEUTIC ACTIVITIES: CPT

## 2019-05-01 PROCEDURE — 80048 BASIC METABOLIC PNL TOTAL CA: CPT

## 2019-05-01 RX ORDER — ASPIRIN 81 MG/1
81 TABLET ORAL 2 TIMES DAILY
Qty: 60 TAB | Refills: 0 | Status: SHIPPED
Start: 2019-05-01

## 2019-05-01 RX ORDER — OXYCODONE AND ACETAMINOPHEN 5; 325 MG/1; MG/1
1-2 TABLET ORAL
Qty: 60 TAB | Refills: 0 | Status: SHIPPED
Start: 2019-05-01 | End: 2019-05-04

## 2019-05-01 RX ADMIN — ASPIRIN 81 MG: 81 TABLET, COATED ORAL at 09:10

## 2019-05-01 RX ADMIN — FERROUS SULFATE TAB 325 MG (65 MG ELEMENTAL FE) 325 MG: 325 (65 FE) TAB at 09:10

## 2019-05-01 RX ADMIN — Medication 10 ML: at 06:55

## 2019-05-01 RX ADMIN — LEVOTHYROXINE SODIUM 50 MCG: 50 TABLET ORAL at 06:55

## 2019-05-01 RX ADMIN — OXYCODONE AND ACETAMINOPHEN 2 TABLET: 5; 325 TABLET ORAL at 14:10

## 2019-05-01 RX ADMIN — OXYCODONE AND ACETAMINOPHEN 2 TABLET: 5; 325 TABLET ORAL at 10:02

## 2019-05-01 RX ADMIN — DOCUSATE SODIUM 100 MG: 100 CAPSULE, LIQUID FILLED ORAL at 09:10

## 2019-05-01 RX ADMIN — OXYCODONE AND ACETAMINOPHEN 2 TABLET: 5; 325 TABLET ORAL at 05:53

## 2019-05-01 RX ADMIN — Medication 10 ML: at 14:15

## 2019-05-01 RX ADMIN — OXYCODONE AND ACETAMINOPHEN 2 TABLET: 5; 325 TABLET ORAL at 01:01

## 2019-05-01 RX ADMIN — LISINOPRIL: 20 TABLET ORAL at 09:10

## 2019-05-01 NOTE — PROGRESS NOTES
Problem: Mobility Impaired (Adult and Pediatric) Goal: *Acute Goals and Plan of Care (Insert Text) Description In 1-7 days pt will be able to perform: ST.  Bed mobility:  Rolling L to R to L modified independent for positioning. 2.  Supine to sit to supine S with HR for meals. 3.  Sit to stand to sit S with RW in prep for ambulation. LT.  Gait:  Ambulate >150ft S with RW, WBAT, for home/community mobility. 2.  Activity tolerance: Tolerate up in chair 1-2 hours for ADL?s. 
3.  Patient/Family Education:  Patient/family to be independent with HEP for follow-up care and safe discharge. Outcome: Progressing Towards Goal 
 PHYSICAL THERAPY TREATMENT Patient: Consuelo Wagner (29 y.o. female) Date: 2019 Diagnosis: Failed total knee arthroplasty ([facility] [address] [T84.018A, Z96.659] <principal problem not specified> Procedure(s) (LRB): LEFT KNEE: REVISION TOTAL KNEE REPLACEMENT (Left) 2 Days Post-Op Precautions: Fall, WBAT Chart, physical therapy assessment, plan of care and goals were reviewed. ASSESSMENT: 
Pt sitting in chair upon arrival. Pt continues to require Min A for sit<>Stands with RW. VCs for hand placement and sequencing. Pt increasing ambulation distance slightly. However due to increase pain and fatigue, multiple standing rest needed. Pt remained sitting in chair post tx. Ice applied to L knee. Cont POC. Progression toward goals: 
?      Improving appropriately and progressing toward goals ? Improving slowly and progressing toward goals ? Not making progress toward goals and plan of care will be adjusted PLAN: 
Patient continues to benefit from skilled intervention to address the above impairments. Continue treatment per established plan of care. Discharge Recommendations:  Rehab Further Equipment Recommendations for Discharge:  rolling walker SUBJECTIVE:  
Patient stated ? I dont have any pain sitting here. ? OBJECTIVE DATA SUMMARY:  
Critical Behavior: Neurologic State: Alert, Appropriate for age Orientation Level: Appropriate for age, Oriented X4 Cognition: Appropriate for age attention/concentration, Appropriate decision making, Appropriate safety awareness Safety/Judgement: Awareness of environment, Fall prevention Functional Mobility Training: 
 
Transfers: 
Sit to Stand: Minimum assistance Stand to Sit: Minimum assistance Balance: 
Sitting: Intact Standing: Intact; With support Ambulation/Gait Training: 
Distance (ft): 65 Feet (ft) Assistive Device: Walker, rolling;Gait belt Ambulation - Level of Assistance: Contact guard assistance Gait Abnormalities: Antalgic;Decreased step clearance; Step to gait Left Side Weight Bearing: As tolerated Base of Support: Widened Stance: Left decreased Speed/Beena: Slow Step Length: Right shortened;Left shortened Swing Pattern: Left asymmetrical;Right asymmetrical 
Interventions: Verbal cues; Safety awareness training; Tactile cues Therapeutic Exercises: LAQ to L x5 
 
Pain: 
Pain Scale 1: Numeric (0 - 10) Pain Intensity 1: 6 Pain Location 1: Knee Pain Orientation 1: Left Pain Description 1: Aching; Sore Pain Intervention(s) 1: Medication (see MAR) Activity Tolerance:  
Fair After treatment:  
? Patient left in no apparent distress sitting up in chair ? Patient left in no apparent distress in bed 
? Call bell left within reach ? Nursing notified ? Caregiver present ? Bed alarm activated Red Rhein, PTA Time Calculation: 28 mins

## 2019-05-01 NOTE — PROGRESS NOTES
Head to toe assessment performed at this time. Pt denies any chest pain or SOB. Pt denies any numbness or tingling to extremities. Pt encouraged to manage pain and to use the incentive spirometer. Pt educated on the side effects of medications taken. Pt left with call light within reach and bed in low position. Will continue to monitor. Shift summary - Pt pain managed with prn medication per MAR. Pt informed about all medications and side effects and encouraged to ask questions about medication. Pt encouraged throughout the night to use incentive spirometer and the purpose of the incentive spirometer. Pt left with no signs of distress and any concerns of pt addressed.

## 2019-05-01 NOTE — PROGRESS NOTES
Problem: Falls - Risk of 
Goal: *Absence of Falls Description Document Alice Herrera Fall Risk and appropriate interventions in the flowsheet. Outcome: Progressing Towards Goal 
  
Problem: Pain Goal: *Control of Pain Outcome: Progressing Towards Goal

## 2019-05-01 NOTE — DISCHARGE SUMMARY
Patient: Vladimir Mack               Sex: female         MRN: [id number]       YOB: 1935      Age:  81 y.o.        LOS:  LOS: 2 days                DOA: 4/29/2019    Discharge Date: 5/1/2019    Admission Diagnoses: Failed [facility] [id number], Z96.659]    Discharge Diagnoses:    Problem List as of 5/1/2019 Date Reviewed: 4/30/2019          Codes Class Noted - Resolved    Failed total knee arthroplasty [facility]) ICD-10-CM: Q52.888A, P54.000  ICD-9-CM: 996.47, V43.65  4/29/2019 - Present        Osteoarthritis of right hip ICD-10-CM: M16.11  ICD-9-CM: 715.95  12/5/2016 - Present              This is a 81 y.o. female with a  history of ongoing knee pain secondary to loosening of previous TKR. The patient has failed to respond to conservative care. The option of a left total knee revision was discussed with the patient. Risks and benefits of the procedure were explained to the patient as well as other treatment options and possible surgical outcomes. The patient acknowledged and consent was obtained. The patient was therefore scheduled to undergo a left revision total knee arthroplasty with Dr. Mary Lou Izquierdo. The patient was taken to the operating room for the above-stated procedure. IV antibiotics were given prior to the incision. SCDs were used for DVT prophylaxis. The patient had an estimated intraoperative blood loss of 250 mL. The patient tolerated the procedure well without any complications, and was taken to the recovery room in stable condition. The patient was then transferred to the postoperative orthopedic floor for convalescence, PT, pain management, as well as discharge planning. Physical therapy and occupational therapy initiated their evaluation and treatment and continued to follow the patient until the patient was discharged. For pain control, a femoral nerve block was used for a bolus the day of surgery and then removed.  Exparel was injected intraoperatively as well for post-op pain management. The patient then was transitioned over to oral narcotics in which was well tolerated. DVT prophylaxis was initiated on the day of surgery including; aspirin, compression stockings and bilateral foot pumps. At the time of discharge, the patient was able to ambulate safely, go up and down stairs and had an understanding of the explicit discharge precautions and instructions following surgery. Home Health will come out to assist the patient with this. The patient was discharged to follow up with Dr. Clarisa Madera in approximately 10 to 14 days. Discharge Condition: Good  DISPOSITION: To SNF. On the day of discharge the patient was afebrile. Vital signs were stable. The patient was in no acute distress. her Left knee incision was clean, dry, and intact. Extremity was warm and well-perfused, distally neurovascularly intact. DISCHARGE INSTRUCTIONS:    The patient will be discharged home on aspirin 81mg twice daily x 1 month for DVT prophylaxis. Continue physical therapy for range of motion, gait training and strengthening. Continue therapeutic exercises. Follow up in 10 to 14 days with Dr. Clarisa Madera. DISCHARGE MEDICATIONS:   Current Discharge Medication List      START taking these medications    Details   aspirin delayed-release 81 mg tablet Take 1 Tab by mouth two (2) times a day. Qty: 60 Tab, Refills: 0      oxyCODONE-acetaminophen (PERCOCET) 5-325 mg per tablet Take 1-2 Tabs by mouth every four (4) hours as needed for Pain for up to 3 days. Max Daily Amount: 12 Tabs. Qty: 60 Tab, Refills: 0    Associated Diagnoses: S/P revision of total knee, left         CONTINUE these medications which have NOT CHANGED    Details   lisinopril-hydroCHLOROthiazide (PRINZIDE, ZESTORETIC) 20-12.5 mg per tablet Take 1 Tab by mouth daily. cholecalciferol (VITAMIN D3) 1,000 unit tablet Take 1,000 Units by mouth daily. vit B-comp w-Fe,Ca,FA<1mg (IRON-VITAMINS PO) Take  by mouth. ferrous sulfate (IRON) 325 mg (65 mg iron) tablet Take  by mouth Daily (before breakfast). levothyroxine (SYNTHROID) 25 mcg tablet Take 50 mcg by mouth Daily (before breakfast). cyanocobalamin 1,000 mcg tablet Take 1,000 mcg by mouth daily. alendronate (FOSAMAX) 70 mg tablet Take 70 mg by mouth Every Saturday.          STOP taking these medications       acetaminophen (TYLENOL) 325 mg tablet Comments:   Reason for Stopping:         ibuprofen (MOTRIN) 400 mg tablet Comments:   Reason for Stopping:

## 2019-05-01 NOTE — OP NOTES
[facility] [place]  OPERATIVE REPORT    Name:  Tammy Ward  MR#:   [id number]  :  1935  ACCOUNT #:  [de-identified]  DATE OF SERVICE:  2019      PREOPERATIVE DIAGNOSIS:  Failed left total knee arthroplasty    POSTOPERATIVE DIAGNOSIS:  Failed left total knee arthroplasty. PROCEDURE PERFORMED:  Revision left total knee arthroplasty. SURGEON:  Sincere Mason MD    ASSISTANT:  Allen Casey PA-C MS    ANESTHESIA:  General.    ANESTHESIOLOGIST:  Jose A Sanz MD    COMPLICATIONS:  None. SPECIMENS REMOVED:  Cultures. IMPLANTS:  TKA. ESTIMATED BLOOD LOSS:  250 mL. PROCEDURE:  The patient was taken to the operating room and placed in supine position on the operating room table. After satisfactory general anesthesia was established, tourniquet was placed on the left thigh. Left leg was prepped with ChloraPrep and draped in a sterile fashion. Outline of the previous incision had been marked in the preoperative hold area, was still visible. It was re-marked at this time. Leg was placed in the leg positioner. Leg was wrapped with Esmarch. Tourniquet was inflated to 300 mmHg. Anterior midline incision was made. The previous incision was used that was extended approximately 1-2 inches proximally and distally. The knee was noted to be quite stiff preoperatively with maximum flexion of 90 degrees. Great care was taken throughout the procedure to avoid damage to the  extensor mechanism. A medial parapatellar incision was made. This was extended along the medial border of the quadriceps tendon and distally on the medial border of the patellar ligaments. The medial and lateral gutters were recreated. Inflamed tissue which had evidence poly debris was removed. After adequate mobilization, attention was directed to the polyethylene component. This was easily removed. The femoral component was replaced next. It was noted to be somewhat unstable.   Upon adequate soft tissue release, a saw was used around the edges of the implant and the femoral component was ultimately able to be disimpacted from the distal femur with minimal bone loss. It is of note that there had been a collapse on the medial femoral condyle side. This was as expected based on the change seen in current x-ray versus those taken approximately 4-5 years ago. Upon inspection of the bone of the medial condyle, there was a section of fibrous tissue which was approximately 5 mm thick which covered the medial femoral condyle and that prevented inadequate support for the failed femoral component. This soft tissue was removed using a Bovie. Attention was then directed to the tibia. The tibial component was evaluated. The micro oscillating saw was used to cut at the metal-bone interface between the tibial component and the tibia. This was done medially and laterally. Following this, the thin osteotome was used to get the remainder of the tibial plateau, the tibial component was then removed. The cement that was in the tibial metaphysis was in place. It was grasped and it remains well fixed in position. The extramedullary glide system was used to cut the proximal tibia to make the proximal portion flat. Following this, with the proximal cement removed and the proximal tibia having a well-fixed cement mantle, it was determined that rather than removing all the cement and cementing along the component, it would be in the patient's best interest to cement into the existing cavity. Reaming was done at the central portion of the cement to allow placement of the tibial component. The StitcherAds and  system was used. The size 5 tibial component was selected and produced a good fit on the top of the tibia. This was removed. Attention was directed to the femur. The shaft was reamed up to 16 mm which produced a good bite.   On the femur, femoral cutting guide was used to cut the dislocated femur medially and laterally. The medial cut was made at a 10 mm cut and the lateral was made at the 5 mm cut. This produced reasonable bony surface with it. The size 4 femoral component was selected as the best fit. The 4 mm offset  was used. The trial components were assembled. The assembled femoral component was placed on the distal femur. Trialing was done with different sized polyethylene liner. The 21 mm liner produced the best fit. The box osteotome and reamer were used to make the posterior stabilized component. Cement was then mixed. The tibial component was cemented into place. The femoral component was cemented next. Cement was placed in the defect which had held the tibial portion. The tibial component was placed. Excess cement was removed. The femoral component was cemented next. Cement was placed on the distal femur on the back of the femoral implant and around the box. The femoral component was then impacted  into position. Excess cement was removed. The trialed poly was placed and the knee was placed in extension. The patella was evaluated. There was minimal layer of the posterior aspect of the patella and therefore this was not removed or resurfaced. After all cement on the field had hardened, the polyethylene component was removed. The permanent component was placed and snapped into position with excellent fit being obtained and then knee was placed through flexion and extension with excellent stability in all components. The patella tracked well. Wounds were irrigated with antibiotic irrigation. The quad tendon was closed using interrupted #1 Vicryl sutures followed by a Stratafix. The subcutaneous tissue was closed using 2-0 Monocryl with a subcuticular 3-0 Monocryl and the Prineo system was used. The knee was injected with Marcaine with epinephrine and tranexamic acid.   After closure of the subcutaneous tissue and skin and placement of the Prineo system, the Mepilex dressing was placed. The patient was transferred onto her recovery room bed and taken to recovery room in stable condition.       Vasu Winn MD      [id number]  D:  04/30/2019 19:42  T:  05/01/2019 1:07  JOB #:  [id number]

## 2019-05-01 NOTE — DISCHARGE INSTRUCTIONS
Total Knee Arthroplasty Discharge Instructions           Dr. Kamlesh Clement    Please take the time to review the following instructions before you leave the hospital and use them as guidelines during your recovery from surgery. If you have any questions you may contact my office at [phone]. Wound Care/Dressing Changes: You may change your dressing as needed. Beginning the 2 days after you are discharged from the hospital you should change your dressing daily. A big, bulky dressing isn't necessary as long as there isn't any drainage from the incisions. You can put a band-aid or Mepilex dressing over the incision and wear ANA hose as needed for comfort and swelling. No dressing is necessary if there is no drainage. It isn't necessary to apply antibiotic ointment to your incisions. Prineo tape will peel off in approximately 2-6 weeks. It does not need to be removed prior to that. When it begins to peel off you can cut the edges away with scissors. Showering/Bathing:    [x]   You may shower 2 days after surgery. Your dressing may be removed for showering. You may get your incisions wet in the shower. Don't vigorously scrub the area where your incisions are. Apply a clean, dry dressing after drying off the area of your incisions. Don't take a tub bath, get in a swimming pool or Jacuzzi until the incisions are completely healed, which is about 14 days. Do not soak your incisions under water. Weight Bearing Status/Activity:          You may walk as tolerated and perform your normal daily activities. Use a walker or a   cane only if you need them. You should strive to achieve full range of motion in   your knee as tolerated. We would like for you to return to your normal activities as soon   as possible. Ice/Elevation:    Continue ice and elevation as needed for pain and swelling. Diet:    Resume your prehospital diet.  If you have excessive nausea or vomitting call your doctor's office. Medications:      1. You will be given a prescription for pain medications when you are discharged from the hospital.  Take the medication as needed according to the directions on the prescription bottle. Possible side effects of the medication include dizziness, headache, nausea, vomiting, constipation and urinary retention. If you experience any of these side effects call the office so that we can assist you in relieving them. Discontinue the use of the pain medication if you develop itching, rash, shortness of breath or difficulties swallowing. If these symptoms become severe or aren't relieved by discontinuing the medication you should seek immediate medical attention. Refills of pain medication are authorized during office hours only. (8AM - 5PM Mon thru Fri)  2. If you were prescribed Percocet/oxycodone or Dilaudid/hydromorphone you must have a written prescription. These medications legally CANNOT be called in to a pharmacy. 3. Do not take Tylenol in addition to your pain medication as most of the pain medication already contains Tylenol. Do not exceed 4000 mg of Tylenol per day. Ex:  (hydrocodon 5/500mg = 500 mg of Tylenol)  4. You may resume the medication you were taking prior to your surgery. Pain medication may change the effects of any antidepressant medication. If you have any questions about possible interactions between your regular medications and the pain medication you should consult the physician who prescribes your regular medications. Stool Softeners:    Pain medications can cause constipation. Stool softeners, warm prune juice and increasing your water and fiber intake can help prevent constipation. Do not take laxatives. Blood Thinner: You will take aspirin 81 mg twice daily for one month following surgery to prevent blood clots.      Home Health:  Begin In-Home Physical Therapy; 3 times a week to work on gait training, range of motion, strengthening, and weight bearing exercises as tolerable. Home health has been arranged for skilled nursing visits and physical therapy. If no one from the agency calls you on the day after you arrive home, please contact them at the number provided at discharge. Follow Up Appointment:     Please call [phone] for a follow appointment with Dr. Sincere Mason in 10-14 days from the time of your surgery. Please let our office know you are scheduling a post-op appointment. Signs and Symptoms to be Aware of: If any of the following signs and symptoms occur, you should contact Dr. Vijay Hathaway office. Please be advised if a problem arises which you feel requires immediate medical attention or you are unable to contact Dr. Vijay Hathaway office you should seek immediate medical attention at the emergency department or other health care facility you have access to. Signs and symptoms to watch for include:     1. A sudden increase in swelling and l or redness or warmth at the area your surgery was performed which isn't relieved by rest, ice and elevation. 2 Oral temperature greater than 101.5 degrees for 12 hours or more which isn't relieved by an increase in fluid intake and taking two Tylenol every 4-6 hours. 3 Excessive drainage from your incisions, or drainage which hasn't stopped by 72 hours after your surgery despite applying a compressive dressing, ice and elevation. 4 Calfpain, tenderness, redness or swelling which isn't relieved with rest and elevation. 5 Fever, chills, shortness ofbreath, chest pain, nausea, vomiting or other signs and symptoms which are of concern to you.      Other Instructions:

## 2019-05-01 NOTE — PROGRESS NOTES
Progress Note Patient: Carlos Isaac MRN: [id number]  SSN: [id number] YOB: 1935  Age: 81 y.o. Sex: female POD:    2 Days Post-Op S/P:    Procedure(s): LEFT KNEE: REVISION TOTAL KNEE REPLACEMENT Subjective:  
Pt is without complaints of pain. Patient states that she walked yesterday and didn't have a lot of pain Objective:  
 
Patient Vitals for the past 12 hrs: 
 BP Temp Pulse Resp SpO2  
05/01/19 0733 110/47 99.5 °F (37.5 °C) 81 16 98 % 05/01/19 0340 130/42 99.8 °F (37.7 °C) 70  97 % 04/30/19 2355 136/51 98.8 °F (37.1 °C) 67 16 91 % Recent Labs 05/01/19 
2205 HGB 9.5* HCT 30.1*   
K 3.7  CO2 27 BUN 21* CREA 1.18  
* Pt. resting in chair. Lower extremity operative dressing clean/dry/intact. Neurovascularly intact. Assessment:  
 
Awake and alert. In good spirits. Plan: 1. Continue pain management/ ice to operative area. 2. Continue to progress with PT/OT. 3. Discharge planning to SNF.

## 2019-05-01 NOTE — PROGRESS NOTES
Plan: pt has been accepted for admission today to [facility] at [address], [address] [place], [place]. Report to [phone]. Please include all hard scripts for controlled substances, med rec and dc summary in packet. Please medicate for pain prior to dc if possible and needed to help offset delay when patient first arrives to facility. Pt will need medical transport scheduled at 3p or later today. Pt aware she may incur cost for transport. 1000- spoke with pt to let her know of acceptance for today to [facility], nurse will contact MD for discharge order and summary. Pt will let her family know of plan. Care Management Interventions PCP Verified by CM: Yes Transition of Care Consult (CM Consult): Discharge Planning, Home Health, SNF [facility] - INPATIENT: No 
Reason Outside [place]: Physician referred to specific agency Physical Therapy Consult: Yes Current Support Network: Lives Alone Plan discussed with Pt/Family/Caregiver: Yes Freedom of Choice Offered: Yes Discharge Location Discharge Placement: Rehab Unit Subacute

## 2019-05-01 NOTE — PROGRESS NOTES
1445 TRANSFER - OUT REPORT: 
 
Verbal report given to WILFRED RN (name) on Jose M Carroll  being transferred to [facility](unit) for routine progression of care Report consisted of patients Situation, Background, Assessment and  
Recommendations(SBAR). Information from the following report(s) SBAR, Kardex, MAR, Recent Results and Med Rec Status was reviewed with the receiving nurse. Opportunity for questions and clarification was provided. Patient transported with: 
 EMT services.

## 2019-05-01 NOTE — ROUTINE PROCESS
Bedside and Verbal shift change report given to ALFREDO Meade RN (oncoming nurse) by CHERYL Mcnamara RN (offgoing nurse). Report included the following information SBAR, Kardex, Intake/Output, MAR and Recent Results.

## 2021-02-05 ENCOUNTER — TRANSCRIBE ORDER (OUTPATIENT)
Dept: SCHEDULING | Age: 86
End: 2021-02-05

## 2021-02-05 DIAGNOSIS — Z96.659 KNEE JOINT REPLACEMENT STATUS: Primary | ICD-10-CM

## 2021-02-11 ENCOUNTER — HOSPITAL ENCOUNTER (OUTPATIENT)
Dept: MRI IMAGING | Age: 86
Discharge: HOME | End: 2021-02-11
Attending: ORTHOPAEDIC SURGERY
Payer: MEDICARE

## 2021-02-11 DIAGNOSIS — Z96.659 KNEE JOINT REPLACEMENT STATUS: ICD-10-CM

## 2021-02-11 PROCEDURE — 73721 MRI JNT OF LWR EXTRE W/O DYE: CPT

## (undated) DEVICE — SWAB CULT LIQ STUART AGR AERB MOD IN BRK SGL RAYON TIP PLAS 220099] BECTON DICKINSON MICRO]

## (undated) DEVICE — SUTURE VCRL SZ 2-0 L18IN ABSRB UD CT-1 L36MM 1/2 CIR J839D

## (undated) DEVICE — HANDPIECE SET WITH FAN SPRAY TIP: Brand: INTERPULSE

## (undated) DEVICE — 2108 SERIES SAGITTAL BLADE, OFFSET (12.4 X 1.24 X 73.7MM)

## (undated) DEVICE — SOLUTION IRRIG 3000ML LAC R FLX CONT

## (undated) DEVICE — DRAPE TWL SURG 16X26IN BLU ORB04] ALLCARE INC]

## (undated) DEVICE — STRAP,POSITIONING,KNEE/BODY,FOAM,4X60": Brand: MEDLINE

## (undated) DEVICE — 3 BONE CEMENT MIXER: Brand: MIXEVAC

## (undated) DEVICE — BLADE ELECTRODE: Brand: EDGE

## (undated) DEVICE — SOL IRR STRL H2O 1500ML BTL --

## (undated) DEVICE — REM POLYHESIVE ADULT PATIENT RETURN ELECTRODE: Brand: VALLEYLAB

## (undated) DEVICE — BANDAGE COMPR SGL LAYERED CLP CLSR ELAS 15FT LEN 6IN W

## (undated) DEVICE — SHEET,DRAPE,70X85,STERILE: Brand: MEDLINE

## (undated) DEVICE — OSCILLATING TIP SAW CARTRIDGE: Brand: PRECISION FALCON

## (undated) DEVICE — PACK PROCEDURE SURG TOT KNEE CUST

## (undated) DEVICE — LIGHT HANDLE: Brand: DEVON

## (undated) DEVICE — (D)PREP SKN CHLRAPRP APPL 26ML -- CONVERT TO ITEM 371833

## (undated) DEVICE — INTENDED FOR TISSUE SEPARATION, AND OTHER PROCEDURES THAT REQUIRE A SHARP SURGICAL BLADE TO PUNCTURE OR CUT.: Brand: BARD-PARKER ® CARBON RIB-BACK BLADES

## (undated) DEVICE — GARMENT COMPR L FOR 13-16IN FT INTMIT SGL BLDR HEM FORC II

## (undated) DEVICE — NON RIMMED SPEED PIN 65MM STERILE

## (undated) DEVICE — NDL SPNE QNCKE 18GX3.5IN LF --

## (undated) DEVICE — CONCISE CEMENT SCULPS KIT: Brand: CONCISE

## (undated) DEVICE — FEMORAL CANAL TIP

## (undated) DEVICE — SUTURE VCRL + SZ 1 L18IN ABSRB UD L36MM CT-1 1/2 CIR VCP841D

## (undated) DEVICE — DRAPE,U/ SHT,SPLIT,PLAS,STERIL: Brand: MEDLINE

## (undated) DEVICE — ZIMMER® STERILE DISPOSABLE TOURNIQUET CUFF WITH PROTECTIVE SLEEVE AND PLC, SINGLE PORT, SINGLE BLADDER, 34 IN. (86 CM)

## (undated) DEVICE — 4-PORT MANIFOLD: Brand: NEPTUNE 2

## (undated) DEVICE — PRECISION (9.0 X 0.51 X 25.0MM)

## (undated) DEVICE — SPONGE LAP 18X18IN STRL -- 5/PK

## (undated) DEVICE — 3M™ STERI-DRAPE™ U-DRAPE 1015: Brand: STERI-DRAPE™